# Patient Record
Sex: MALE | Race: WHITE | NOT HISPANIC OR LATINO | Employment: FULL TIME | URBAN - METROPOLITAN AREA
[De-identification: names, ages, dates, MRNs, and addresses within clinical notes are randomized per-mention and may not be internally consistent; named-entity substitution may affect disease eponyms.]

---

## 2023-05-15 ENCOUNTER — TELEPHONE (OUTPATIENT)
Dept: PRIMARY CARE | Facility: CLINIC | Age: 53
End: 2023-05-15

## 2023-05-15 DIAGNOSIS — E11.65 TYPE 2 DIABETES MELLITUS WITH HYPERGLYCEMIA, WITHOUT LONG-TERM CURRENT USE OF INSULIN (MULTI): Primary | ICD-10-CM

## 2023-05-15 RX ORDER — SEMAGLUTIDE 1.34 MG/ML
INJECTION, SOLUTION SUBCUTANEOUS
COMMUNITY
Start: 2022-03-08 | End: 2023-05-16 | Stop reason: SDUPTHER

## 2023-05-15 NOTE — TELEPHONE ENCOUNTER
Rx Refill Request Telephone Encounter    Name:  Juancarlos Valle  :  [id number]  Medication Name:  ozempic  4mg/3ml          Specific Pharmacy location:  [facility]

## 2023-05-16 DIAGNOSIS — E11.65 TYPE 2 DIABETES MELLITUS WITH HYPERGLYCEMIA, WITHOUT LONG-TERM CURRENT USE OF INSULIN (MULTI): Primary | ICD-10-CM

## 2023-05-16 RX ORDER — SEMAGLUTIDE 1.34 MG/ML
1 INJECTION, SOLUTION SUBCUTANEOUS
Qty: 3 ML | Refills: 0 | Status: SHIPPED | OUTPATIENT
Start: 2023-05-16 | End: 2024-05-07 | Stop reason: WASHOUT

## 2023-05-16 RX ORDER — SEMAGLUTIDE 1.34 MG/ML
1 INJECTION, SOLUTION SUBCUTANEOUS
Qty: 3 ML | Refills: 3 | Status: SHIPPED | OUTPATIENT
Start: 2023-05-16 | End: 2023-10-18 | Stop reason: SDUPTHER

## 2023-06-01 RX ORDER — ASPIRIN 81 MG/1
1 TABLET ORAL DAILY
COMMUNITY

## 2023-06-01 RX ORDER — CHOLECALCIFEROL (VITAMIN D3) 50 MCG
1 TABLET ORAL DAILY
COMMUNITY
Start: 2018-10-02

## 2023-06-01 RX ORDER — METFORMIN HYDROCHLORIDE 1000 MG/1
1 TABLET ORAL
COMMUNITY
Start: 2022-06-03 | End: 2023-10-18 | Stop reason: SDUPTHER

## 2023-06-01 RX ORDER — ICOSAPENT ETHYL 1 G/1
2 CAPSULE ORAL 2 TIMES DAILY
COMMUNITY
Start: 2022-02-25 | End: 2023-10-31

## 2023-06-01 RX ORDER — LISINOPRIL 10 MG/1
1 TABLET ORAL DAILY
COMMUNITY
End: 2023-10-27

## 2023-06-01 RX ORDER — NITROGLYCERIN 0.4 MG/1
0.4 TABLET SUBLINGUAL EVERY 5 MIN PRN
COMMUNITY
Start: 2020-07-10 | End: 2024-05-07 | Stop reason: SDUPTHER

## 2023-06-01 RX ORDER — ATORVASTATIN CALCIUM 80 MG/1
1 TABLET, FILM COATED ORAL DAILY
COMMUNITY
End: 2023-10-27

## 2023-06-01 RX ORDER — EMPAGLIFLOZIN 25 MG/1
1 TABLET, FILM COATED ORAL
COMMUNITY
Start: 2022-03-08 | End: 2023-10-31 | Stop reason: WASHOUT

## 2023-06-01 RX ORDER — OMEPRAZOLE 20 MG/1
1 TABLET, DELAYED RELEASE ORAL DAILY
COMMUNITY

## 2023-06-01 RX ORDER — METOPROLOL SUCCINATE 200 MG/1
1 TABLET, EXTENDED RELEASE ORAL DAILY
COMMUNITY
End: 2023-10-27

## 2023-06-01 RX ORDER — EZETIMIBE 10 MG/1
1 TABLET ORAL DAILY
COMMUNITY
Start: 2022-02-25 | End: 2024-05-07 | Stop reason: WASHOUT

## 2023-10-06 ENCOUNTER — TELEPHONE (OUTPATIENT)
Dept: PRIMARY CARE | Facility: CLINIC | Age: 53
End: 2023-10-06
Payer: COMMERCIAL

## 2023-10-18 ENCOUNTER — OFFICE VISIT (OUTPATIENT)
Dept: PRIMARY CARE | Facility: CLINIC | Age: 53
End: 2023-10-18
Payer: COMMERCIAL

## 2023-10-18 VITALS
WEIGHT: 281.4 LBS | BODY MASS INDEX: 45.22 KG/M2 | HEART RATE: 65 BPM | HEIGHT: 66 IN | RESPIRATION RATE: 16 BRPM | SYSTOLIC BLOOD PRESSURE: 102 MMHG | OXYGEN SATURATION: 95 % | DIASTOLIC BLOOD PRESSURE: 68 MMHG

## 2023-10-18 DIAGNOSIS — Z00.00 ANNUAL PHYSICAL EXAM: Primary | ICD-10-CM

## 2023-10-18 DIAGNOSIS — Z12.12 SCREENING FOR COLORECTAL CANCER: ICD-10-CM

## 2023-10-18 DIAGNOSIS — I10 HYPERTENSION, ESSENTIAL, BENIGN: ICD-10-CM

## 2023-10-18 DIAGNOSIS — E11.65 TYPE 2 DIABETES MELLITUS WITH HYPERGLYCEMIA, WITHOUT LONG-TERM CURRENT USE OF INSULIN (MULTI): ICD-10-CM

## 2023-10-18 DIAGNOSIS — E66.01 CLASS 3 SEVERE OBESITY DUE TO EXCESS CALORIES WITH SERIOUS COMORBIDITY AND BODY MASS INDEX (BMI) OF 45.0 TO 49.9 IN ADULT (MULTI): ICD-10-CM

## 2023-10-18 DIAGNOSIS — Z12.11 SCREENING FOR COLORECTAL CANCER: ICD-10-CM

## 2023-10-18 DIAGNOSIS — Z12.5 SCREENING FOR PROSTATE CANCER: ICD-10-CM

## 2023-10-18 DIAGNOSIS — E55.9 HYPOVITAMINOSIS D: ICD-10-CM

## 2023-10-18 DIAGNOSIS — E78.2 MIXED HYPERLIPIDEMIA: ICD-10-CM

## 2023-10-18 PROBLEM — E66.813 CLASS 3 SEVERE OBESITY DUE TO EXCESS CALORIES WITH SERIOUS COMORBIDITY AND BODY MASS INDEX (BMI) OF 45.0 TO 49.9 IN ADULT: Status: ACTIVE | Noted: 2023-10-18

## 2023-10-18 PROBLEM — G47.33 OBSTRUCTIVE SLEEP APNEA SYNDROME: Status: ACTIVE | Noted: 2023-10-18

## 2023-10-18 PROBLEM — E11.9 TYPE 2 DIABETES MELLITUS (MULTI): Status: ACTIVE | Noted: 2023-10-18

## 2023-10-18 PROBLEM — I25.2 HISTORY OF ST ELEVATION MYOCARDIAL INFARCTION (STEMI): Status: ACTIVE | Noted: 2023-10-18

## 2023-10-18 PROBLEM — I21.02 ST ELEVATION MYOCARDIAL INFARCTION INVOLVING LEFT ANTERIOR DESCENDING (LAD) CORONARY ARTERY (MULTI): Status: ACTIVE | Noted: 2023-10-18

## 2023-10-18 PROBLEM — Z95.5 H/O HEART ARTERY STENT: Status: ACTIVE | Noted: 2023-10-18

## 2023-10-18 PROBLEM — I25.10 CAD (CORONARY ARTERY DISEASE): Status: ACTIVE | Noted: 2023-10-18

## 2023-10-18 PROBLEM — E88.810 METABOLIC SYNDROME: Status: ACTIVE | Noted: 2023-10-18

## 2023-10-18 PROBLEM — E78.1 HIGH TRIGLYCERIDES: Status: ACTIVE | Noted: 2023-10-18

## 2023-10-18 PROCEDURE — 99396 PREV VISIT EST AGE 40-64: CPT | Performed by: NURSE PRACTITIONER

## 2023-10-18 PROCEDURE — 3046F HEMOGLOBIN A1C LEVEL >9.0%: CPT | Performed by: NURSE PRACTITIONER

## 2023-10-18 PROCEDURE — 99214 OFFICE O/P EST MOD 30 MIN: CPT | Performed by: NURSE PRACTITIONER

## 2023-10-18 PROCEDURE — 1036F TOBACCO NON-USER: CPT | Performed by: NURSE PRACTITIONER

## 2023-10-18 PROCEDURE — 4010F ACE/ARB THERAPY RXD/TAKEN: CPT | Performed by: NURSE PRACTITIONER

## 2023-10-18 PROCEDURE — 3074F SYST BP LT 130 MM HG: CPT | Performed by: NURSE PRACTITIONER

## 2023-10-18 PROCEDURE — 3008F BODY MASS INDEX DOCD: CPT | Performed by: NURSE PRACTITIONER

## 2023-10-18 PROCEDURE — 3078F DIAST BP <80 MM HG: CPT | Performed by: NURSE PRACTITIONER

## 2023-10-18 PROCEDURE — 99401 PREV MED CNSL INDIV APPRX 15: CPT | Performed by: NURSE PRACTITIONER

## 2023-10-18 RX ORDER — SODIUM FLUORIDE1.1%, POTASSIUM NITRATE 5% 5.8; 57.5 MG/ML; MG/ML
GEL, DENTIFRICE DENTAL
COMMUNITY
Start: 2023-04-07 | End: 2023-10-31 | Stop reason: ALTCHOICE

## 2023-10-18 RX ORDER — SEMAGLUTIDE 1.34 MG/ML
1 INJECTION, SOLUTION SUBCUTANEOUS
Qty: 3 ML | Refills: 0 | Status: SHIPPED | OUTPATIENT
Start: 2023-10-18 | End: 2024-05-07 | Stop reason: WASHOUT

## 2023-10-18 RX ORDER — METFORMIN HYDROCHLORIDE 1000 MG/1
1000 TABLET ORAL
Qty: 180 TABLET | Refills: 2 | Status: SHIPPED | OUTPATIENT
Start: 2023-10-18

## 2023-10-18 ASSESSMENT — COLUMBIA-SUICIDE SEVERITY RATING SCALE - C-SSRS
6. HAVE YOU EVER DONE ANYTHING, STARTED TO DO ANYTHING, OR PREPARED TO DO ANYTHING TO END YOUR LIFE?: NO
2. HAVE YOU ACTUALLY HAD ANY THOUGHTS OF KILLING YOURSELF?: NO
1. IN THE PAST MONTH, HAVE YOU WISHED YOU WERE DEAD OR WISHED YOU COULD GO TO SLEEP AND NOT WAKE UP?: NO

## 2023-10-18 ASSESSMENT — PATIENT HEALTH QUESTIONNAIRE - PHQ9
SUM OF ALL RESPONSES TO PHQ9 QUESTIONS 1 AND 2: 0
1. LITTLE INTEREST OR PLEASURE IN DOING THINGS: NOT AT ALL
2. FEELING DOWN, DEPRESSED OR HOPELESS: NOT AT ALL

## 2023-10-18 ASSESSMENT — ANXIETY QUESTIONNAIRES
4. TROUBLE RELAXING: NOT AT ALL
2. NOT BEING ABLE TO STOP OR CONTROL WORRYING: NOT AT ALL
6. BECOMING EASILY ANNOYED OR IRRITABLE: NOT AT ALL
7. FEELING AFRAID AS IF SOMETHING AWFUL MIGHT HAPPEN: NOT AT ALL
3. WORRYING TOO MUCH ABOUT DIFFERENT THINGS: NOT AT ALL
1. FEELING NERVOUS, ANXIOUS, OR ON EDGE: NOT AT ALL
IF YOU CHECKED OFF ANY PROBLEMS ON THIS QUESTIONNAIRE, HOW DIFFICULT HAVE THESE PROBLEMS MADE IT FOR YOU TO DO YOUR WORK, TAKE CARE OF THINGS AT HOME, OR GET ALONG WITH OTHER PEOPLE: NOT DIFFICULT AT ALL
5. BEING SO RESTLESS THAT IT IS HARD TO SIT STILL: NOT AT ALL
GAD7 TOTAL SCORE: 0

## 2023-10-18 ASSESSMENT — ENCOUNTER SYMPTOMS
OCCASIONAL FEELINGS OF UNSTEADINESS: 0
DEPRESSION: 0
LOSS OF SENSATION IN FEET: 0

## 2023-10-18 NOTE — PROGRESS NOTES
"Subjective   Patient ID: Juancarlos Valle is a 53 y.o. male who presents for Follow-up.    Patient is following up for annual physical exam and management of multiple chronic diseases.  He is currently on Ozempic 1 mg injection weekly.  His most recent hemoglobin A1c was 9.2% which is very elevated indicating uncontrolled diabetes.  Reports that he ran out a few weeks ago and will need refills.  He has never completed screening for colorectal cancer.  Advises that he has follow-up with his cardiology next month.  Advises that he has no acute medical complaint.         Review of Systems   All other systems reviewed and are negative.      Objective   /68   Pulse 65   Resp 16   Ht 1.676 m (5' 6\")   Wt 128 kg (281 lb 6.4 oz)   SpO2 95%   BMI 45.42 kg/m²     Physical Exam  Constitutional:       Appearance: Normal appearance. He is obese.   HENT:      Head: Normocephalic and atraumatic.      Right Ear: External ear normal.      Left Ear: External ear normal.      Nose: Nose normal.      Mouth/Throat:      Mouth: Mucous membranes are moist.   Cardiovascular:      Rate and Rhythm: Normal rate and regular rhythm.      Pulses: Normal pulses.      Heart sounds: Normal heart sounds.   Pulmonary:      Effort: Pulmonary effort is normal.      Breath sounds: Normal breath sounds.   Abdominal:      General: Bowel sounds are normal.      Palpations: Abdomen is soft.   Musculoskeletal:      Cervical back: Neck supple.   Skin:     General: Skin is warm and dry.   Neurological:      General: No focal deficit present.      Mental Status: He is alert and oriented to person, place, and time.   Psychiatric:         Mood and Affect: Mood normal.         Behavior: Behavior normal.         Thought Content: Thought content normal.         Judgment: Judgment normal.         Assessment/Plan   Problem List Items Addressed This Visit       Mixed hyperlipidemia    Relevant Orders    Lipid panel    TSH with reflex to Free T4 if abnormal    " Hypertension, essential, benign    Relevant Orders    CBC    Hypovitaminosis D - Primary    Relevant Orders    Vitamin D 25-Hydroxy,Total (for eval of Vitamin D levels)    Vitamin B12    Type 2 diabetes mellitus (CMS/HCC)    Relevant Medications    semaglutide (Ozempic) 1 mg/dose (4 mg/3 mL) pen injector    metFORMIN (Glucophage) 1,000 mg tablet    semaglutide 2 mg/dose (8 mg/3 mL) pen injector    Other Relevant Orders    Hemoglobin A1C    Comprehensive Metabolic Panel    Urinalysis with Reflex Microscopic    Albumin, urine, random    Follow Up In [facility] - PCP - Established     Other Visit Diagnoses       Screening for colorectal cancer        Relevant Orders    Colonoscopy Screening    Screening for prostate cancer        Relevant Orders    PSA

## 2023-10-22 DIAGNOSIS — I21.02 ST ELEVATION (STEMI) MYOCARDIAL INFARCTION INVOLVING LEFT ANTERIOR DESCENDING CORONARY ARTERY (MULTI): ICD-10-CM

## 2023-10-22 DIAGNOSIS — I25.10 ATHEROSCLEROTIC HEART DISEASE OF NATIVE CORONARY ARTERY WITHOUT ANGINA PECTORIS: ICD-10-CM

## 2023-10-23 DIAGNOSIS — I10 ESSENTIAL (PRIMARY) HYPERTENSION: ICD-10-CM

## 2023-10-26 NOTE — PROGRESS NOTES
Chief Complaint/Reason for Visit:   Patient is coming in today as a 6 month Cardiovascular follow up.        History Of Present Illness:    Mr. Valle is coming today as a 6-month cardiovascular follow-up.  We have followed this patient previously for ASHD, chronic diastolic heart failure, and hyperlipidemia.  In 2018, patient had an anterior STEMI with PCI/KAREN to the proximal to mid LAD and a POBA to the distal LAD.    Patient comes in today generally feeling well.  He is a bit discouraged about his weight.  He denies any chest pain, pressure, palpitations, orthopnea, or edema.  He has been taking his medication as prescribed.    Past Medical History:  He has a past medical history of Personal history of other diseases of the digestive system and Personal history of other endocrine, nutritional and metabolic disease.    Past Surgical History:  He has a past surgical history that includes Tympanostomy tube placement (06/05/2018); Ankle surgery (06/05/2018); Hernia repair (06/05/2018); and Other surgical history (05/25/2018).      Social History:  He reports that he has quit smoking. His smoking use included cigarettes. He has never used smokeless tobacco. He reports that he does not currently use alcohol. He reports that he does not use drugs.    Family History:  Family History   Problem Relation Name Age of Onset    Scleroderma Mother      Crohn's disease Brother          Allergies:  Levofloxacin    Medications:  Current Outpatient Medications   Medication Instructions    aspirin 81 mg EC tablet 1 tablet, oral, Daily    atorvastatin (LIPITOR) 80 mg, oral, Daily    cholecalciferol (Vitamin D-3) 50 MCG (2000 UT) tablet 1 tablet, oral, Daily    ezetimibe (Zetia) 10 mg tablet 1 tablet, oral, Daily    icosapent ethyL (Vascepa) 1 gram capsule 2 capsules, oral, 2 times daily    Jardiance 25 mg 1 tablet, oral, Daily before breakfast    lisinopril 10 mg, oral, Daily    metFORMIN (GLUCOPHAGE) 1,000 mg, oral, 2 times daily with  meals    metoprolol succinate XL (TOPROL-XL) 200 mg, oral, Daily    nitroglycerin (NITROSTAT) 0.4 mg, sublingual, Every 5 min PRN    omeprazole OTC (PriLOSEC OTC) 20 mg EC tablet 1 tablet, oral, Daily    Ozempic 1 mg, subcutaneous, Weekly    Ozempic 1 mg, subcutaneous, Weekly    semaglutide 2 mg, subcutaneous, Weekly    sodium fluoride-pot nitrate 1.1-5 % paste USE TO BRUSH TEETH TWICE DAILY, RINSE AND SPIT OUT       Review of Systems:  Review of Systems   Constitutional: Negative. Negative for decreased appetite.   HENT: Negative.     Eyes:  Negative for blurred vision and visual disturbance.   Cardiovascular:  Negative for chest pain, dyspnea on exertion, irregular heartbeat, leg swelling, orthopnea, palpitations and syncope.   Respiratory: Negative.  Negative for cough and shortness of breath.    Musculoskeletal:  Negative for arthritis and falls.   Gastrointestinal: Negative.    Neurological:  Negative for focal weakness and light-headedness.   Psychiatric/Behavioral:  Negative for depression and memory loss.         Vitals  Visit Vitals  /74   Pulse 92   Wt 127 kg (279 lb)   BMI 45.03 kg/m²   Smoking Status Former   BSA 2.43 m²          Physical Exam:   GEN: well-nourished, no acute distress  SKIN: no rashes, well perfused  HEENT: normocephalic and atraumatic, no neck swelling  ORAL: Mask intact, lips and oropharynx normal appearance  CV: Normal S1 S2 no murmurs, no carotid bruits, trace bilateral leg swelling, no venous prominence in the neck  RESP:Lung sounds are clear, thorax symmetrical, normal respiratory effort and rate, no accessory muscle use  MSK: normal appearing muscle tone, normal range of motion, no joint swelling  NEURO: no gross focal neuro deficit, oriented to time, place, and person  ABD: nondistended  PSYCH: normal mood and affect     Last Labs:  CBC -  Lab Results   Component Value Date    WBC 13.8 (H) 01/23/2023    HGB 17.0 01/23/2023    HCT 48.9 01/23/2023    MCV 89 01/23/2023    PLT  280 01/23/2023     Lab Results   Component Value Date    GLUCOSE 221 (H) 01/23/2023    CALCIUM 9.1 01/23/2023     (L) 01/23/2023    K 4.5 01/23/2023    CO2 28 01/23/2023    CL 98 01/23/2023    BUN 12 01/23/2023    CREATININE 0.81 01/23/2023      CMP -  Lab Results   Component Value Date    CALCIUM 9.1 01/23/2023    PROT 7.0 01/23/2023    ALBUMIN 4.2 01/23/2023    AST 22 01/23/2023    ALT 44 01/23/2023    ALKPHOS 114 01/23/2023    BILITOT 1.8 (H) 01/23/2023       LIPID PANEL -   Lab Results   Component Value Date    CHOL 152 02/21/2022    TRIG 276 (H) 02/21/2022    HDL 31.4 (A) 02/21/2022    CHHDL 4.8 02/21/2022    LDLF 65 02/21/2022    VLDL 55 (H) 02/21/2022    NHDL 121 02/21/2022       Lab Results   Component Value Date    HGBA1C 9.2 (A) 01/23/2023         Lab review: I have personally reviewed the laboratory result(s)     Assessment/Plan:  ASHD: Patient had a history of a STEMI with PCI to the proximal to mid LAD and POBA to the distal LAD in 2018.  His current cardiac regimen consists of an aspirin, atorvastatin, Zetia, lisinopril, and metoprolol.  He had been prescribed Jardiance and Vascepa but could not afford either.  He does take over-the-counter fish oil.  Patient is angina class 0.  We discussed the importance of working on weight loss and eating a heart healthy diet.    Hypertension: Blood pressure today is well controlled at 116/74.  He will continue on his current blood pressure lowering medications.    Hyperlipidemia: Patient currently is on atorvastatin 80 mg nightly, fish oil, and Zetia 10 mg nightly.  He is scheduled for fasting lab work this week including a lipid profile.  Ideally, we would like to see him with an LDL of less than 70.  Patient will continue on his current regimen for now.    Patient will follow-up with Dr. Islas in 6 months.  Patient instructed to call with any cardiovascular complaints. All questions were answered.       Dragon dictation was utilized to create this  document. Quite often unanticipated grammatical, syntax,  and other interpretive errors are inadvertently transcribed by the computer software.  Please disregard these errors.  Please excuse any errors that have escaped final proofreading.          Sahara Ballesteros, NIKUNJ-CNP

## 2023-10-27 RX ORDER — ATORVASTATIN CALCIUM 80 MG/1
80 TABLET, FILM COATED ORAL DAILY
Qty: 90 TABLET | Refills: 1 | Status: SHIPPED | OUTPATIENT
Start: 2023-10-27 | End: 2024-05-07 | Stop reason: SDUPTHER

## 2023-10-27 RX ORDER — LISINOPRIL 10 MG/1
10 TABLET ORAL DAILY
Qty: 90 TABLET | Refills: 1 | Status: SHIPPED | OUTPATIENT
Start: 2023-10-27 | End: 2024-05-07 | Stop reason: SDUPTHER

## 2023-10-27 RX ORDER — METOPROLOL SUCCINATE 200 MG/1
200 TABLET, EXTENDED RELEASE ORAL DAILY
Qty: 90 TABLET | Refills: 1 | Status: SHIPPED | OUTPATIENT
Start: 2023-10-27 | End: 2024-05-07 | Stop reason: SDUPTHER

## 2023-10-31 ENCOUNTER — OFFICE VISIT (OUTPATIENT)
Dept: CARDIOLOGY | Facility: CLINIC | Age: 53
End: 2023-10-31
Payer: COMMERCIAL

## 2023-10-31 VITALS
WEIGHT: 279 LBS | DIASTOLIC BLOOD PRESSURE: 74 MMHG | BODY MASS INDEX: 45.03 KG/M2 | HEART RATE: 92 BPM | SYSTOLIC BLOOD PRESSURE: 116 MMHG

## 2023-10-31 DIAGNOSIS — I10 HYPERTENSION, ESSENTIAL, BENIGN: ICD-10-CM

## 2023-10-31 DIAGNOSIS — E78.2 MIXED HYPERLIPIDEMIA: ICD-10-CM

## 2023-10-31 DIAGNOSIS — I25.10 CORONARY ARTERY DISEASE INVOLVING NATIVE CORONARY ARTERY OF NATIVE HEART WITHOUT ANGINA PECTORIS: Primary | ICD-10-CM

## 2023-10-31 PROCEDURE — 3078F DIAST BP <80 MM HG: CPT | Performed by: NURSE PRACTITIONER

## 2023-10-31 PROCEDURE — 3008F BODY MASS INDEX DOCD: CPT | Performed by: NURSE PRACTITIONER

## 2023-10-31 PROCEDURE — 3074F SYST BP LT 130 MM HG: CPT | Performed by: NURSE PRACTITIONER

## 2023-10-31 PROCEDURE — 1036F TOBACCO NON-USER: CPT | Performed by: NURSE PRACTITIONER

## 2023-10-31 PROCEDURE — 3046F HEMOGLOBIN A1C LEVEL >9.0%: CPT | Performed by: NURSE PRACTITIONER

## 2023-10-31 PROCEDURE — 4010F ACE/ARB THERAPY RXD/TAKEN: CPT | Performed by: NURSE PRACTITIONER

## 2023-10-31 PROCEDURE — 99214 OFFICE O/P EST MOD 30 MIN: CPT | Performed by: NURSE PRACTITIONER

## 2023-10-31 ASSESSMENT — ENCOUNTER SYMPTOMS
DYSPNEA ON EXERTION: 0
BLURRED VISION: 0
SYNCOPE: 0
SHORTNESS OF BREATH: 0
CONSTITUTIONAL NEGATIVE: 1
DEPRESSION: 0
PALPITATIONS: 0
GASTROINTESTINAL NEGATIVE: 1
FOCAL WEAKNESS: 0
FALLS: 0
LIGHT-HEADEDNESS: 0
DECREASED APPETITE: 0
RESPIRATORY NEGATIVE: 1
MEMORY LOSS: 0
COUGH: 0
IRREGULAR HEARTBEAT: 0
ORTHOPNEA: 0

## 2023-10-31 NOTE — PATIENT INSTRUCTIONS
Continue on current meds  Heart healthy, low sodium diet  Mediterranean diet is recommended  Follow up with Dr Islas .in 6 months

## 2023-11-01 ENCOUNTER — LAB (OUTPATIENT)
Dept: LAB | Facility: LAB | Age: 53
End: 2023-11-01
Payer: COMMERCIAL

## 2023-11-01 DIAGNOSIS — E55.9 HYPOVITAMINOSIS D: Primary | ICD-10-CM

## 2023-11-01 DIAGNOSIS — I10 HYPERTENSION, ESSENTIAL, BENIGN: ICD-10-CM

## 2023-11-01 DIAGNOSIS — E11.65 TYPE 2 DIABETES MELLITUS WITH HYPERGLYCEMIA, WITHOUT LONG-TERM CURRENT USE OF INSULIN (MULTI): ICD-10-CM

## 2023-11-01 DIAGNOSIS — E78.2 MIXED HYPERLIPIDEMIA: ICD-10-CM

## 2023-11-01 DIAGNOSIS — R79.89 LOW VITAMIN B12 LEVEL: ICD-10-CM

## 2023-11-01 DIAGNOSIS — E55.9 HYPOVITAMINOSIS D: ICD-10-CM

## 2023-11-01 DIAGNOSIS — Z12.5 SCREENING FOR PROSTATE CANCER: ICD-10-CM

## 2023-11-01 LAB
25(OH)D3 SERPL-MCNC: 19 NG/ML (ref 30–100)
ALBUMIN SERPL BCP-MCNC: 4.1 G/DL (ref 3.4–5)
ALP SERPL-CCNC: 124 U/L (ref 33–120)
ALT SERPL W P-5'-P-CCNC: 33 U/L (ref 10–52)
ANION GAP SERPL CALC-SCNC: 14 MMOL/L (ref 10–20)
APPEARANCE UR: ABNORMAL
AST SERPL W P-5'-P-CCNC: 18 U/L (ref 9–39)
BILIRUB SERPL-MCNC: 1.4 MG/DL (ref 0–1.2)
BILIRUB UR STRIP.AUTO-MCNC: NEGATIVE MG/DL
BUN SERPL-MCNC: 12 MG/DL (ref 6–23)
CALCIUM SERPL-MCNC: 9.1 MG/DL (ref 8.6–10.3)
CAOX CRY #/AREA UR COMP ASSIST: NORMAL /HPF
CHLORIDE SERPL-SCNC: 100 MMOL/L (ref 98–107)
CHOLEST SERPL-MCNC: 108 MG/DL (ref 0–199)
CHOLESTEROL/HDL RATIO: 2.9
CO2 SERPL-SCNC: 25 MMOL/L (ref 21–32)
COLOR UR: YELLOW
CREAT SERPL-MCNC: 0.72 MG/DL (ref 0.5–1.3)
CREAT UR-MCNC: 227.1 MG/DL (ref 20–370)
ERYTHROCYTE [DISTWIDTH] IN BLOOD BY AUTOMATED COUNT: 12 % (ref 11.5–14.5)
EST. AVERAGE GLUCOSE BLD GHB EST-MCNC: 266 MG/DL
GFR SERPL CREATININE-BSD FRML MDRD: >90 ML/MIN/1.73M*2
GLUCOSE SERPL-MCNC: 254 MG/DL (ref 74–99)
GLUCOSE UR STRIP.AUTO-MCNC: ABNORMAL MG/DL
HBA1C MFR BLD: 10.9 %
HCT VFR BLD AUTO: 46.8 % (ref 41–52)
HDLC SERPL-MCNC: 36.8 MG/DL
HGB BLD-MCNC: 15.9 G/DL (ref 13.5–17.5)
KETONES UR STRIP.AUTO-MCNC: ABNORMAL MG/DL
LDLC SERPL CALC-MCNC: 25 MG/DL
LEUKOCYTE ESTERASE UR QL STRIP.AUTO: NEGATIVE
MCH RBC QN AUTO: 31.4 PG (ref 26–34)
MCHC RBC AUTO-ENTMCNC: 34 G/DL (ref 32–36)
MCV RBC AUTO: 92 FL (ref 80–100)
MICROALBUMIN UR-MCNC: 12.9 MG/L
MICROALBUMIN/CREAT UR: 5.7 UG/MG CREAT
MUCOUS THREADS #/AREA URNS AUTO: NORMAL /LPF
NITRITE UR QL STRIP.AUTO: NEGATIVE
NON HDL CHOLESTEROL: 71 MG/DL (ref 0–149)
NRBC BLD-RTO: 0 /100 WBCS (ref 0–0)
PH UR STRIP.AUTO: 5 [PH]
PLATELET # BLD AUTO: 287 X10*3/UL (ref 150–450)
PMV BLD AUTO: 10 FL (ref 7.5–11.5)
POTASSIUM SERPL-SCNC: 4.2 MMOL/L (ref 3.5–5.3)
PROT SERPL-MCNC: 5.9 G/DL (ref 6.4–8.2)
PROT UR STRIP.AUTO-MCNC: ABNORMAL MG/DL
PSA SERPL-MCNC: 0.19 NG/ML
RBC # BLD AUTO: 5.07 X10*6/UL (ref 4.5–5.9)
RBC # UR STRIP.AUTO: NEGATIVE /UL
RBC #/AREA URNS AUTO: NORMAL /HPF
SODIUM SERPL-SCNC: 135 MMOL/L (ref 136–145)
SP GR UR STRIP.AUTO: 1.03
TRIGL SERPL-MCNC: 233 MG/DL (ref 0–149)
TSH SERPL-ACNC: 1.7 MIU/L (ref 0.44–3.98)
UROBILINOGEN UR STRIP.AUTO-MCNC: <2 MG/DL
VIT B12 SERPL-MCNC: 281 PG/ML (ref 211–911)
VLDL: 47 MG/DL (ref 0–40)
WBC # BLD AUTO: 10.5 X10*3/UL (ref 4.4–11.3)
WBC #/AREA URNS AUTO: NORMAL /HPF

## 2023-11-01 PROCEDURE — 84153 ASSAY OF PSA TOTAL: CPT

## 2023-11-01 PROCEDURE — 83036 HEMOGLOBIN GLYCOSYLATED A1C: CPT

## 2023-11-01 PROCEDURE — 84443 ASSAY THYROID STIM HORMONE: CPT

## 2023-11-01 PROCEDURE — 80053 COMPREHEN METABOLIC PANEL: CPT

## 2023-11-01 PROCEDURE — 81001 URINALYSIS AUTO W/SCOPE: CPT

## 2023-11-01 PROCEDURE — 36415 COLL VENOUS BLD VENIPUNCTURE: CPT

## 2023-11-01 PROCEDURE — 82306 VITAMIN D 25 HYDROXY: CPT

## 2023-11-01 PROCEDURE — 82607 VITAMIN B-12: CPT

## 2023-11-01 PROCEDURE — 82043 UR ALBUMIN QUANTITATIVE: CPT

## 2023-11-01 PROCEDURE — 85027 COMPLETE CBC AUTOMATED: CPT

## 2023-11-01 PROCEDURE — 82570 ASSAY OF URINE CREATININE: CPT

## 2023-11-01 PROCEDURE — 80061 LIPID PANEL: CPT

## 2023-11-01 RX ORDER — PNV NO.95/FERROUS FUM/FOLIC AC 28MG-0.8MG
100 TABLET ORAL DAILY
Qty: 90 TABLET | Refills: 3 | Status: SHIPPED | OUTPATIENT
Start: 2023-11-01 | End: 2024-05-07 | Stop reason: WASHOUT

## 2023-11-01 RX ORDER — ERGOCALCIFEROL 1.25 MG/1
50000 CAPSULE ORAL
Qty: 12 CAPSULE | Refills: 2 | Status: SHIPPED | OUTPATIENT
Start: 2023-11-01 | End: 2024-05-07 | Stop reason: WASHOUT

## 2023-11-02 ENCOUNTER — TELEPHONE (OUTPATIENT)
Dept: PRIMARY CARE | Facility: CLINIC | Age: 53
End: 2023-11-02
Payer: COMMERCIAL

## 2023-11-02 NOTE — TELEPHONE ENCOUNTER
----- Message from NIKUNJ Lemus-CNP sent at 11/1/2023  8:30 PM EDT -----  Please, tell patient that his lab results show low vitamin D at 17, low vitamin B12 at 281, elevated Triglycerides at 233 and very elevated A1C at 10.9%. I have started him on vitamin D 50,000 units weekly, vitamin B12 100mcg daily and he should continue with fish oil. I strongly recommend that he follows up with his endocrinologist, Dr. Ramon in [place] for his elevated A1C at 10.9%.

## 2024-05-07 ENCOUNTER — OFFICE VISIT (OUTPATIENT)
Dept: CARDIOLOGY | Facility: CLINIC | Age: 54
End: 2024-05-07
Payer: COMMERCIAL

## 2024-05-07 VITALS
HEIGHT: 66 IN | WEIGHT: 266 LBS | SYSTOLIC BLOOD PRESSURE: 122 MMHG | BODY MASS INDEX: 42.75 KG/M2 | DIASTOLIC BLOOD PRESSURE: 78 MMHG | HEART RATE: 96 BPM

## 2024-05-07 DIAGNOSIS — E78.1 HYPERTRIGLYCERIDEMIA: ICD-10-CM

## 2024-05-07 DIAGNOSIS — I25.10 ATHEROSCLEROTIC HEART DISEASE OF NATIVE CORONARY ARTERY WITHOUT ANGINA PECTORIS: ICD-10-CM

## 2024-05-07 DIAGNOSIS — I25.10 CORONARY ARTERY DISEASE INVOLVING NATIVE CORONARY ARTERY OF NATIVE HEART WITHOUT ANGINA PECTORIS: Primary | ICD-10-CM

## 2024-05-07 DIAGNOSIS — I21.02 ST ELEVATION (STEMI) MYOCARDIAL INFARCTION INVOLVING LEFT ANTERIOR DESCENDING CORONARY ARTERY (MULTI): ICD-10-CM

## 2024-05-07 DIAGNOSIS — I10 ESSENTIAL (PRIMARY) HYPERTENSION: ICD-10-CM

## 2024-05-07 DIAGNOSIS — E11.69 TYPE 2 DIABETES MELLITUS WITH OTHER SPECIFIED COMPLICATION, UNSPECIFIED WHETHER LONG TERM INSULIN USE (MULTI): ICD-10-CM

## 2024-05-07 PROCEDURE — 1036F TOBACCO NON-USER: CPT | Performed by: INTERNAL MEDICINE

## 2024-05-07 PROCEDURE — 93000 ELECTROCARDIOGRAM COMPLETE: CPT | Performed by: INTERNAL MEDICINE

## 2024-05-07 PROCEDURE — 3008F BODY MASS INDEX DOCD: CPT | Performed by: INTERNAL MEDICINE

## 2024-05-07 PROCEDURE — 99214 OFFICE O/P EST MOD 30 MIN: CPT | Performed by: INTERNAL MEDICINE

## 2024-05-07 PROCEDURE — 4010F ACE/ARB THERAPY RXD/TAKEN: CPT | Performed by: INTERNAL MEDICINE

## 2024-05-07 PROCEDURE — 3078F DIAST BP <80 MM HG: CPT | Performed by: INTERNAL MEDICINE

## 2024-05-07 PROCEDURE — 3074F SYST BP LT 130 MM HG: CPT | Performed by: INTERNAL MEDICINE

## 2024-05-07 RX ORDER — ATORVASTATIN CALCIUM 80 MG/1
80 TABLET, FILM COATED ORAL DAILY
Qty: 90 TABLET | Refills: 3 | Status: SHIPPED | OUTPATIENT
Start: 2024-05-07 | End: 2025-05-07

## 2024-05-07 RX ORDER — NITROGLYCERIN 0.4 MG/1
0.4 TABLET SUBLINGUAL EVERY 5 MIN PRN
Qty: 45 TABLET | Refills: 11 | Status: SHIPPED | OUTPATIENT
Start: 2024-05-07 | End: 2025-05-07

## 2024-05-07 RX ORDER — METOPROLOL SUCCINATE 200 MG/1
200 TABLET, EXTENDED RELEASE ORAL DAILY
Qty: 90 TABLET | Refills: 3 | Status: SHIPPED | OUTPATIENT
Start: 2024-05-07 | End: 2025-05-07

## 2024-05-07 RX ORDER — LISINOPRIL 10 MG/1
10 TABLET ORAL DAILY
Qty: 90 TABLET | Refills: 3 | Status: SHIPPED | OUTPATIENT
Start: 2024-05-07 | End: 2025-05-07

## 2024-05-07 NOTE — PATIENT INSTRUCTIONS
Thanks for following up in office today.    1)  We reviewed your cholesterol blood work.  Your triglyceride level is high , this should come down once your blood sugar is under better control.  We do have a list of foods that are high in triglycerides.    2)  I have sent in refills for your medications.     3)  Please continue your cardiac medications as prescribed.    Follow up with LILI VIRAMONTES  in 6  months  If you have any questions, please call [phone] and choose option for Dr. Islas's nurse Batsheva Levin

## 2024-05-07 NOTE — PROGRESS NOTES
Chief Complaint:   No chief complaint on file.     History Of Present Illness:    Juancarlos Valle is a 53 y.o. male presenting for yearly follow up    h/o CAD s/p anterior STEMI 5/19/18 s/p PCI with KAREN to prox-mid LAD and POBA distal LAD, Preserved LVEF 55%, moderate upper septal hypertrophy, mild eccentric AR, DL/hypertriglyceridemia, DM2 who presents for annual follow up.     No chest pain/pressure.  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  With this no chest pain/sob.  No LE edema, orthopnea.  No palps, LH/dizziness, near syncope, syncope.  Does not smoke tobacco.      ROS:  The remainder of the review of systems was obtained, as was negative as pertains to the chief complaint.    CV testing :  Lipid labs 11/2023  chol 108, HDL 36.8  LDL 25  trig 233, CMP  K 4.2  BUN 12 crea 0.72  ast 18  alt 33    Stress test 6/2018:  Normal exercise stress test, below average exercise capacity.    TTE 5/2018  EF 55% stage I DD, moderate upper septal hypertrophy, mild aortic regurg , trace tricuspid regurg,     [facility] 5/2018  PCI  mid LAD    Prior history:   The patient presented in 5/2018 with c/o chest pain waking him from sleep,with asociated SOB, dizziness, clammy sensation, diaphoresis. [facility] cor angio demonstrated acute prox-mid LAD plaque rupture, with 100% thrombotic occlusion of the distal LAD. S/p successful PCI with aspiration thrombectomy, Xience Alpine 3.5 x 23mm KAREN to prox-mid LAD, postdilated with NC 3.5mm balloon at 20 ayla, then 3.75mm balloon at 22 ayla; as well as POBA to distal LAD with improvement in blood flow. His TTE on 5/22/18 demonstrated EF 55%, moderate upper septal hypertrophy with normal LV thickness, mild eccentric AR, trace TR.     Last Recorded Vitals:  There were no vitals filed for this visit.    Past Medical History:  He has a past medical history of Diabetes mellitus (Multi), Hyperlipidemia, Hypertension, MI (myocardial infarction) (Multi), Personal history of other diseases of  the digestive system, and Personal history of other endocrine, nutritional and metabolic disease.    Past Surgical History:  He has a past surgical history that includes Tympanostomy tube placement (06/05/2018); Ankle surgery (06/05/2018); Hernia repair (06/05/2018); Other surgical history (05/25/2018); and Cardiac catheterization.      Social History:  He reports that he has quit smoking. His smoking use included cigarettes. He has never used smokeless tobacco. He reports that he does not currently use alcohol. He reports that he does not use drugs.    Family History:  Family History   Problem Relation Name Age of Onset    Scleroderma Mother      Crohn's disease Brother          Allergies:  Levofloxacin    Outpatient Medications:  Current Outpatient Medications   Medication Instructions    aspirin 81 mg EC tablet 1 tablet, oral, Daily    atorvastatin (LIPITOR) 80 mg, oral, Daily    cholecalciferol (Vitamin D-3) 50 MCG (2000 UT) tablet 1 tablet, oral, Daily    cyanocobalamin (VITAMIN B-12) 100 mcg, oral, Daily    ergocalciferol (VITAMIN D-2) 50,000 Units, oral, Once Weekly    ezetimibe (Zetia) 10 mg tablet 1 tablet, oral, Daily    lisinopril 10 mg, oral, Daily    metFORMIN (GLUCOPHAGE) 1,000 mg, oral, 2 times daily with meals    metoprolol succinate XL (TOPROL-XL) 200 mg, oral, Daily    nitroglycerin (NITROSTAT) 0.4 mg, sublingual, Every 5 min PRN    omeprazole OTC (PriLOSEC OTC) 20 mg EC tablet 1 tablet, oral, Daily    Ozempic 1 mg, subcutaneous, Once Weekly    Ozempic 1 mg, subcutaneous, Once Weekly    semaglutide 2 mg, subcutaneous, Once Weekly       Physical Exam:  Physical Exam  HENT:      Head: Normocephalic.      Nose: Nose normal.   Cardiovascular:      Rate and Rhythm: Normal rate and regular rhythm.      Heart sounds: Normal heart sounds.      Comments: No carotid bruits   No leg swelling  Pulmonary:      Breath sounds: Normal breath sounds.   Abdominal:      Palpations: Abdomen is soft.   Musculoskeletal:          General: Normal range of motion.      Cervical back: Normal range of motion.   Skin:     General: Skin is warm and dry.   Neurological:      General: No focal deficit present.      Mental Status: He is alert.   Psychiatric:         Mood and Affect: Mood normal.            Last Labs:  CBC -  Lab Results   Component Value Date    WBC 10.5 11/01/2023    HGB 15.9 11/01/2023    HCT 46.8 11/01/2023    MCV 92 11/01/2023     11/01/2023       CMP -  Lab Results   Component Value Date    CALCIUM 9.1 11/01/2023    PROT 5.9 (L) 11/01/2023    ALBUMIN 4.1 11/01/2023    AST 18 11/01/2023    ALT 33 11/01/2023    ALKPHOS 124 (H) 11/01/2023    BILITOT 1.4 (H) 11/01/2023       LIPID PANEL -   Lab Results   Component Value Date    CHOL 108 11/01/2023    TRIG 233 (H) 11/01/2023    HDL 36.8 11/01/2023    CHHDL 2.9 11/01/2023    LDLF 65 02/21/2022    VLDL 47 (H) 11/01/2023    NHDL 71 11/01/2023       RENAL FUNCTION PANEL -   Lab Results   Component Value Date    GLUCOSE 254 (H) 11/01/2023     (L) 11/01/2023    K 4.2 11/01/2023     11/01/2023    CO2 25 11/01/2023    ANIONGAP 14 11/01/2023    BUN 12 11/01/2023    CREATININE 0.72 11/01/2023    GFRMALE >90 01/23/2023    CALCIUM 9.1 11/01/2023    ALBUMIN 4.1 11/01/2023        Lab Results   Component Value Date    HGBA1C 10.9 (H) 11/01/2023               Assessment/Plan   ASHD  DL - hypertriglyceridemia  DM2    Doing well symptomatically, no angina, no HF signs/symptoms.    Of note, lipids at goal on high dose atorva (stopped zetia on his own), however with high HbA1c > 10, his TG are > 250.  We discussed the importance of DM management and diet control for lowering Tg's.    Plan:  -ASA 81mg daily lifelong  -continue toprol XL 200mg daily   -cont lisinopril 10mg daily  -c/w high intensity statin therapy - atorva 80mg daily  -vascepa too expensive - ok to continue fish oil 2gm bid  -advocated improvement in DM2 control, reg mod intensity exercise, and weight  loss  -aggressive secondary prevention, we discussed today 150 mins of moderate intensity exercise  -SL Ntg prn

## 2024-07-05 DIAGNOSIS — E11.65 TYPE 2 DIABETES MELLITUS WITH HYPERGLYCEMIA, WITHOUT LONG-TERM CURRENT USE OF INSULIN (MULTI): ICD-10-CM

## 2024-07-09 RX ORDER — SEMAGLUTIDE 2.68 MG/ML
2 INJECTION, SOLUTION SUBCUTANEOUS
Qty: 3 ML | Refills: 0 | Status: SHIPPED | OUTPATIENT
Start: 2024-07-14

## 2024-10-30 ENCOUNTER — TELEPHONE (OUTPATIENT)
Dept: PRIMARY CARE | Facility: CLINIC | Age: 54
End: 2024-10-30
Payer: COMMERCIAL

## 2024-11-07 ENCOUNTER — TELEPHONE (OUTPATIENT)
Dept: CARDIOLOGY | Facility: HOSPITAL | Age: 54
End: 2024-11-07

## 2024-11-07 ENCOUNTER — APPOINTMENT (OUTPATIENT)
Dept: CARDIOLOGY | Facility: CLINIC | Age: 54
End: 2024-11-07
Payer: COMMERCIAL

## 2024-11-07 VITALS
HEART RATE: 102 BPM | WEIGHT: 262 LBS | SYSTOLIC BLOOD PRESSURE: 130 MMHG | OXYGEN SATURATION: 95 % | DIASTOLIC BLOOD PRESSURE: 84 MMHG | BODY MASS INDEX: 42.11 KG/M2 | HEIGHT: 66 IN

## 2024-11-07 DIAGNOSIS — E78.2 MIXED HYPERLIPIDEMIA: ICD-10-CM

## 2024-11-07 DIAGNOSIS — Z95.5 H/O HEART ARTERY STENT: ICD-10-CM

## 2024-11-07 DIAGNOSIS — I25.2 HISTORY OF ST ELEVATION MYOCARDIAL INFARCTION (STEMI): ICD-10-CM

## 2024-11-07 DIAGNOSIS — I21.02 ST ELEVATION (STEMI) MYOCARDIAL INFARCTION INVOLVING LEFT ANTERIOR DESCENDING CORONARY ARTERY (MULTI): ICD-10-CM

## 2024-11-07 DIAGNOSIS — I10 HYPERTENSION, ESSENTIAL, BENIGN: ICD-10-CM

## 2024-11-07 DIAGNOSIS — I25.10 CORONARY ARTERY DISEASE INVOLVING NATIVE CORONARY ARTERY OF NATIVE HEART WITHOUT ANGINA PECTORIS: Primary | ICD-10-CM

## 2024-11-07 DIAGNOSIS — I10 ESSENTIAL (PRIMARY) HYPERTENSION: ICD-10-CM

## 2024-11-07 DIAGNOSIS — I25.10 ATHEROSCLEROTIC HEART DISEASE OF NATIVE CORONARY ARTERY WITHOUT ANGINA PECTORIS: ICD-10-CM

## 2024-11-07 PROCEDURE — 1036F TOBACCO NON-USER: CPT | Performed by: PHYSICIAN ASSISTANT

## 2024-11-07 PROCEDURE — 3079F DIAST BP 80-89 MM HG: CPT | Performed by: PHYSICIAN ASSISTANT

## 2024-11-07 PROCEDURE — 4010F ACE/ARB THERAPY RXD/TAKEN: CPT | Performed by: PHYSICIAN ASSISTANT

## 2024-11-07 PROCEDURE — 99213 OFFICE O/P EST LOW 20 MIN: CPT | Performed by: PHYSICIAN ASSISTANT

## 2024-11-07 PROCEDURE — 3075F SYST BP GE 130 - 139MM HG: CPT | Performed by: PHYSICIAN ASSISTANT

## 2024-11-07 PROCEDURE — 3008F BODY MASS INDEX DOCD: CPT | Performed by: PHYSICIAN ASSISTANT

## 2024-11-07 RX ORDER — LISINOPRIL 10 MG/1
10 TABLET ORAL DAILY
Qty: 90 TABLET | Refills: 3 | Status: SHIPPED | OUTPATIENT
Start: 2024-11-07 | End: 2025-11-07

## 2024-11-07 RX ORDER — METOPROLOL SUCCINATE 200 MG/1
200 TABLET, EXTENDED RELEASE ORAL DAILY
Qty: 90 TABLET | Refills: 3 | Status: SHIPPED | OUTPATIENT
Start: 2024-11-07 | End: 2025-11-07

## 2024-11-07 RX ORDER — ATORVASTATIN CALCIUM 80 MG/1
80 TABLET, FILM COATED ORAL DAILY
Qty: 90 TABLET | Refills: 3 | Status: SHIPPED | OUTPATIENT
Start: 2024-11-07 | End: 2025-11-07

## 2024-11-07 ASSESSMENT — ENCOUNTER SYMPTOMS
FEVER: 0
NAUSEA: 0
DYSURIA: 0
WHEEZING: 0
VOMITING: 0
ABDOMINAL PAIN: 0
PALPITATIONS: 0
ORTHOPNEA: 0
SHORTNESS OF BREATH: 0
WEAKNESS: 0
DIARRHEA: 0

## 2024-11-07 NOTE — PROGRESS NOTES
Cardiology Follow Up  Chief Complaint:   Patient is here for 6 month office visit.      History Of Present Illness:    Juancarlos Valle is a 53 y.o. male presenting for yearly follow up    h/o CAD s/p anterior STEMI 5/19/18 s/p PCI with KAREN to prox-mid LAD and POBA distal LAD, Preserved LVEF 55%, moderate upper septal hypertrophy, mild eccentric AR, DL/hypertriglyceridemia, DM2 who presents for annual follow up.      No chest pain/pressure.  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  With this no chest pain/sob.  No LE edema, orthopnea.  No palps, LH/dizziness, near syncope, syncope.  Does not smoke tobacco.       ROS:  The remainder of the review of systems was obtained, as was negative as pertains to the chief complaint.     CV testing :  Lipid labs 11/2023  chol 108, HDL 36.8  LDL 25  trig 233, CMP  K 4.2  BUN 12 crea 0.72  ast 18  alt 33     Stress test 6/2018:  Normal exercise stress test, below average exercise capacity.     TTE 5/2018  EF 55% stage I DD, moderate upper septal hypertrophy, mild aortic regurg , trace tricuspid regurg,      [facility] 5/2018  PCI  mid LAD     Prior history:   The patient presented in 5/2018 with c/o chest pain waking him from sleep,with asociated SOB, dizziness, clammy sensation, diaphoresis. [facility] cor angio demonstrated acute prox-mid LAD plaque rupture, with 100% thrombotic occlusion of the distal LAD. S/p successful PCI with aspiration thrombectomy, Xience Alpine 3.5 x 23mm KAREN to prox-mid LAD, postdilated with NC 3.5mm balloon at 20 ayla, then 3.75mm balloon at 22 ayla; as well as POBA to distal LAD with improvement in blood flow. His TTE on 5/22/18 demonstrated EF 55%, moderate upper septal hypertrophy with normal LV thickness, mild eccentric AR, trace TR.   11-7-24: This a pleasant 54-year-old patient known to Dr. Islas.  He presents for 6-month follow-up.  In the last 6 months no change in cardiovascular status.  He works at [employer] and does quite a bit of  "walking and lifting without chest pain shortness of breath palpitations lightheadedness dizziness or syncope.  Unfortunately has stopped taking his atorvastatin lisinopril and metoprolol due to cost of the medication but I did refill these medications and gave him a good Rx card to see if we can reduce his cost.  He does not smoke.  Denies any other new cardiac complaints or concerns at this time.       Last Recorded Vitals:  Vitals:    11/07/24 0832   BP: 130/84   BP Location: Left arm   Patient Position: Sitting   BP Cuff Size: Large adult   Pulse: 102   SpO2: 95%   Weight: 119 kg (262 lb)   Height: 1.676 m (5' 6\")       Past Medical History:  He has a past medical history of Diabetes mellitus (Multi), Hyperlipidemia, Hypertension, MI (myocardial infarction) (Multi), Personal history of other diseases of the digestive system, and Personal history of other endocrine, nutritional and metabolic disease.    Past Surgical History:  He has a past surgical history that includes Tympanostomy tube placement (06/05/2018); Ankle surgery (06/05/2018); Hernia repair (06/05/2018); Other surgical history (05/25/2018); and Cardiac catheterization.      Social History:  He reports that he has quit smoking. His smoking use included cigarettes. He has never used smokeless tobacco. He reports that he does not currently use alcohol. He reports that he does not use drugs.    Family History:  Family History   Problem Relation Name Age of Onset    Scleroderma Mother      Crohn's disease Brother          Allergies:  Levofloxacin    Outpatient Medications:  Current Outpatient Medications   Medication Instructions    aspirin 81 mg EC tablet 1 tablet, Daily    atorvastatin (LIPITOR) 80 mg, oral, Daily    cholecalciferol (Vitamin D-3) 50 MCG (2000 UT) tablet 1 tablet, Daily    lisinopril 10 mg, oral, Daily    metFORMIN (GLUCOPHAGE) 1,000 mg, oral, 2 times daily (morning and late afternoon)    metoprolol succinate XL (TOPROL-XL) 200 mg, oral, " Daily    nitroglycerin (NITROSTAT) 0.4 mg, sublingual, Every 5 min PRN    omeprazole OTC (PriLOSEC OTC) 20 mg EC tablet 1 tablet, Daily    Ozempic 2 mg, subcutaneous, Once Weekly     Review of Systems   Constitutional: Negative for fever and malaise/fatigue.   Cardiovascular:  Negative for chest pain, orthopnea and palpitations.   Respiratory:  Negative for shortness of breath and wheezing.    Skin:  Negative for itching and rash.   Gastrointestinal:  Negative for abdominal pain, diarrhea, nausea and vomiting.   Genitourinary:  Negative for dysuria.   Neurological:  Negative for weakness.      Physical Exam  Constitutional:       General: He is not in acute distress.     Appearance: Normal appearance.   HENT:      Mouth/Throat:      Mouth: Mucous membranes are moist.   Neck:      Comments: No obvious JVD  Cardiovascular:      Rate and Rhythm: Normal rate and regular rhythm.      Heart sounds: Normal heart sounds. No murmur heard.  Abdominal:      General: Abdomen is flat. Bowel sounds are normal.      Palpations: Abdomen is soft.   Musculoskeletal:         General: No swelling.   Skin:     General: Skin is warm and dry.   Neurological:      Mental Status: He is alert and oriented to person, place, and time.   Psychiatric:         Mood and Affect: Mood normal.           Last Labs:  CBC -  Lab Results   Component Value Date    WBC 10.5 11/01/2023    HGB 15.9 11/01/2023    HCT 46.8 11/01/2023    MCV 92 11/01/2023     11/01/2023       CMP -  Lab Results   Component Value Date    CALCIUM 9.1 11/01/2023    PROT 5.9 (L) 11/01/2023    ALBUMIN 4.1 11/01/2023    AST 18 11/01/2023    ALT 33 11/01/2023    ALKPHOS 124 (H) 11/01/2023    BILITOT 1.4 (H) 11/01/2023       LIPID PANEL -   Lab Results   Component Value Date    CHOL 108 11/01/2023    TRIG 233 (H) 11/01/2023    HDL 36.8 11/01/2023    CHHDL 2.9 11/01/2023    LDLF 65 02/21/2022    VLDL 47 (H) 11/01/2023    NHDL 71 11/01/2023       RENAL FUNCTION PANEL -   Lab  Results   Component Value Date    GLUCOSE 254 (H) 11/01/2023     (L) 11/01/2023    K 4.2 11/01/2023     11/01/2023    CO2 25 11/01/2023    ANIONGAP 14 11/01/2023    BUN 12 11/01/2023    CREATININE 0.72 11/01/2023    GFRMALE >90 01/23/2023    CALCIUM 9.1 11/01/2023    ALBUMIN 4.1 11/01/2023        Lab Results   Component Value Date    HGBA1C 10.9 (H) 11/01/2023       Last Cardiology Tests:    Echo: 2018--normal LVSF.      Cath: 2018--STEMI and PCI LAD      No recent results to review    Assessment/Plan   Problem List Items Addressed This Visit             ICD-10-CM       Cardiac and Vasculature    CAD (coronary artery disease) - Primary I25.10    Relevant Medications    metoprolol succinate XL (Toprol-XL) 200 mg 24 hr tablet    H/O heart artery stent Z95.5    Mixed hyperlipidemia E78.2    History of ST elevation myocardial infarction (STEMI) I25.2    Hypertension, essential, benign I10     Other Visit Diagnoses         Codes    Essential (primary) hypertension     I10    Relevant Medications    lisinopril 10 mg tablet    Atherosclerotic heart disease of native coronary artery without angina pectoris     I25.10    Relevant Medications    metoprolol succinate XL (Toprol-XL) 200 mg 24 hr tablet    ST elevation (STEMI) myocardial infarction involving left anterior descending coronary artery (Multi)     I21.02    Relevant Medications    atorvastatin (Lipitor) 80 mg tablet    metoprolol succinate XL (Toprol-XL) 200 mg 24 hr tablet        ASHD/history of ST elevation MI--patient denies any chest pain or anginal symptoms.  Again he does remain very active during his work activities without any exertional complaints or concerns.  Will get him back on his atorvastatin, lisinopril and metoprolol and gave him a good Rx card and sent new prescriptions to his pharmacy and hopefully this will help with his cost.  I did inform him that he needs to have his metformin and Ozempic refilled by PCP.  Previously LV systolic  function was normal.  Hypertension--blood pressure slightly elevated but he just got off work and has not had his meds which again were refilled.  If the patient is still having a problem with cost of medications he would like to switch pharmacies and we can send prescriptions to what ever pharmacy he would like.  Elevated triglycerides--patient counseled regarding better dietary choices and also good control of his diabetes but again he has not been on metformin or his Ozempic.  He needs to get those medications refilled through his PCP.  Overall patient is doing well but frustrated over the cost of the medications but we will refill to his pharmacy and gave him a good Rx card to hopefully save him some money.  If the patient continues to have issues and would like to try different pharmacy he needs to call the office.  Will otherwise arrange follow-up with Dr. Galo in 6 months time.      Kb Manley PA-C  11/7/2024  8:46 AM

## 2024-11-07 NOTE — TELEPHONE ENCOUNTER
Patient asked if he needs to fill any blood work for Dr. Islas at checkout today. Patient has no outstanding orders at this time. Patient is asking for a call from a provider to confirm.

## 2024-11-07 NOTE — TELEPHONE ENCOUNTER
Called patient after reviewing today's office note.  He has not been taking his meds for awhile. Told him that if he does end up going back on his meds then when he does see his PCP in the near future that would be about the time to have his labs done and he should be able  order labs for him to get done. He expressed understanding.

## 2024-12-05 ENCOUNTER — APPOINTMENT (OUTPATIENT)
Dept: PRIMARY CARE | Facility: CLINIC | Age: 54
End: 2024-12-05
Payer: COMMERCIAL

## 2024-12-05 VITALS
DIASTOLIC BLOOD PRESSURE: 70 MMHG | WEIGHT: 265 LBS | OXYGEN SATURATION: 95 % | HEART RATE: 85 BPM | SYSTOLIC BLOOD PRESSURE: 110 MMHG | BODY MASS INDEX: 42.77 KG/M2

## 2024-12-05 DIAGNOSIS — Z12.11 SCREENING FOR COLORECTAL CANCER: ICD-10-CM

## 2024-12-05 DIAGNOSIS — E11.69 TYPE 2 DIABETES MELLITUS WITH OTHER SPECIFIED COMPLICATION, UNSPECIFIED WHETHER LONG TERM INSULIN USE (MULTI): ICD-10-CM

## 2024-12-05 DIAGNOSIS — Z00.00 ANNUAL PHYSICAL EXAM: Primary | ICD-10-CM

## 2024-12-05 DIAGNOSIS — G47.33 OBSTRUCTIVE SLEEP APNEA SYNDROME: ICD-10-CM

## 2024-12-05 DIAGNOSIS — E78.2 MIXED HYPERLIPIDEMIA: ICD-10-CM

## 2024-12-05 DIAGNOSIS — E66.813 CLASS 3 SEVERE OBESITY DUE TO EXCESS CALORIES WITH SERIOUS COMORBIDITY AND BODY MASS INDEX (BMI) OF 40.0 TO 44.9 IN ADULT: ICD-10-CM

## 2024-12-05 DIAGNOSIS — E66.01 CLASS 3 SEVERE OBESITY DUE TO EXCESS CALORIES WITH SERIOUS COMORBIDITY AND BODY MASS INDEX (BMI) OF 40.0 TO 44.9 IN ADULT: ICD-10-CM

## 2024-12-05 DIAGNOSIS — I25.10 CORONARY ARTERY DISEASE INVOLVING NATIVE CORONARY ARTERY OF NATIVE HEART WITHOUT ANGINA PECTORIS: ICD-10-CM

## 2024-12-05 DIAGNOSIS — I10 HYPERTENSION, ESSENTIAL, BENIGN: ICD-10-CM

## 2024-12-05 DIAGNOSIS — Z12.5 SCREENING FOR PROSTATE CANCER: ICD-10-CM

## 2024-12-05 DIAGNOSIS — Z12.12 SCREENING FOR COLORECTAL CANCER: ICD-10-CM

## 2024-12-05 DIAGNOSIS — E55.9 VITAMIN D DEFICIENCY: ICD-10-CM

## 2024-12-05 DIAGNOSIS — I25.2 HISTORY OF ST ELEVATION MYOCARDIAL INFARCTION (STEMI): ICD-10-CM

## 2024-12-05 LAB — POC HEMOGLOBIN A1C: 13.7 % (ref 4.2–6.5)

## 2024-12-05 PROCEDURE — 82043 UR ALBUMIN QUANTITATIVE: CPT

## 2024-12-05 PROCEDURE — 1036F TOBACCO NON-USER: CPT | Performed by: NURSE PRACTITIONER

## 2024-12-05 PROCEDURE — 3074F SYST BP LT 130 MM HG: CPT | Performed by: NURSE PRACTITIONER

## 2024-12-05 PROCEDURE — 99396 PREV VISIT EST AGE 40-64: CPT | Performed by: NURSE PRACTITIONER

## 2024-12-05 PROCEDURE — 4010F ACE/ARB THERAPY RXD/TAKEN: CPT | Performed by: NURSE PRACTITIONER

## 2024-12-05 PROCEDURE — 3078F DIAST BP <80 MM HG: CPT | Performed by: NURSE PRACTITIONER

## 2024-12-05 PROCEDURE — 83036 HEMOGLOBIN GLYCOSYLATED A1C: CPT | Performed by: NURSE PRACTITIONER

## 2024-12-05 PROCEDURE — 99214 OFFICE O/P EST MOD 30 MIN: CPT | Performed by: NURSE PRACTITIONER

## 2024-12-05 PROCEDURE — 82570 ASSAY OF URINE CREATININE: CPT

## 2024-12-05 RX ORDER — METFORMIN HYDROCHLORIDE 1000 MG/1
1000 TABLET ORAL
Qty: 200 TABLET | Refills: 3 | Status: SHIPPED | OUTPATIENT
Start: 2024-12-05 | End: 2026-01-09

## 2024-12-05 RX ORDER — GLIMEPIRIDE 2 MG/1
2 TABLET ORAL 2 TIMES DAILY
Qty: 180 TABLET | Refills: 0 | Status: SHIPPED | OUTPATIENT
Start: 2024-12-05 | End: 2025-03-05

## 2024-12-05 RX ORDER — ERGOCALCIFEROL 1.25 MG/1
50000 CAPSULE ORAL WEEKLY
Qty: 12 CAPSULE | Refills: 2 | Status: SHIPPED | OUTPATIENT
Start: 2024-12-05 | End: 2025-09-01

## 2024-12-05 ASSESSMENT — ANXIETY QUESTIONNAIRES
7. FEELING AFRAID AS IF SOMETHING AWFUL MIGHT HAPPEN: NOT AT ALL
5. BEING SO RESTLESS THAT IT IS HARD TO SIT STILL: NOT AT ALL
1. FEELING NERVOUS, ANXIOUS, OR ON EDGE: NOT AT ALL
4. TROUBLE RELAXING: NOT AT ALL
6. BECOMING EASILY ANNOYED OR IRRITABLE: NOT AT ALL
GAD7 TOTAL SCORE: 0
3. WORRYING TOO MUCH ABOUT DIFFERENT THINGS: NOT AT ALL
2. NOT BEING ABLE TO STOP OR CONTROL WORRYING: NOT AT ALL

## 2024-12-05 ASSESSMENT — ENCOUNTER SYMPTOMS
LOSS OF SENSATION IN FEET: 0
OCCASIONAL FEELINGS OF UNSTEADINESS: 0
DEPRESSION: 0

## 2024-12-05 ASSESSMENT — PATIENT HEALTH QUESTIONNAIRE - PHQ9
1. LITTLE INTEREST OR PLEASURE IN DOING THINGS: NOT AT ALL
SUM OF ALL RESPONSES TO PHQ9 QUESTIONS 1 AND 2: 0
2. FEELING DOWN, DEPRESSED OR HOPELESS: NOT AT ALL

## 2024-12-05 NOTE — PATIENT INSTRUCTIONS
Your hemoglobin A1c is very elevated at 10.9 which indicates uncontrolled diabetes and your vitamin D level is low at 19.  I have started you on metformin, glimepiride and vitamin D with labs ordered to be completed a few days prior to 3 months follow-up.

## 2024-12-05 NOTE — PROGRESS NOTES
Subjective   Patient ID: Juancarlos Valle is a 54 y.o. male who presents for 6 mo follolw up.    Patient is following up for annual physical exam, lab results review and management of type 2 diabetes mellitus, morbid obesity, hypertension and vitamin D deficiency.  He is in office hemoglobin A1c equals 13.7% and his most recent labs shows low serum vitamin D level of 19.  Reports  since his insurance stopped paying for Ozempic, he has not been taking any medication for diabetes.  He follows with cardiology for management of multiple cardiac comorbidities.  He denies acute medical complaint.         Review of Systems   All other systems reviewed and are negative.      Objective   Wt 120 kg (265 lb)   BMI 42.77 kg/m²     Physical Exam  Vitals reviewed.   Constitutional:       Appearance: Normal appearance.   HENT:      Head: Normocephalic and atraumatic.      Right Ear: External ear normal.      Left Ear: External ear normal.      Nose: Nose normal.      Mouth/Throat:      Mouth: Mucous membranes are moist.   Cardiovascular:      Rate and Rhythm: Normal rate and regular rhythm.      Pulses: Normal pulses.      Heart sounds: Normal heart sounds.   Pulmonary:      Effort: Pulmonary effort is normal.      Breath sounds: Normal breath sounds.   Abdominal:      General: Bowel sounds are normal.      Palpations: Abdomen is soft.      Comments: Obese   Musculoskeletal:      Cervical back: Neck supple.   Skin:     General: Skin is warm and dry.   Neurological:      General: No focal deficit present.      Mental Status: He is alert and oriented to person, place, and time.   Psychiatric:         Mood and Affect: Mood normal.         Behavior: Behavior normal.         Thought Content: Thought content normal.         Judgment: Judgment normal.         Assessment/Plan   Problem List Items Addressed This Visit       Type 2 diabetes mellitus    Relevant Orders    Albumin-Creatinine Ratio, Urine Random    POCT glycosylated hemoglobin (Hb  A1C) manually resulted

## 2024-12-06 LAB
CREAT UR-MCNC: 29.7 MG/DL (ref 20–370)
MICROALBUMIN UR-MCNC: <7 MG/L
MICROALBUMIN/CREAT UR: NORMAL MG/G{CREAT}

## 2024-12-09 ENCOUNTER — TELEPHONE (OUTPATIENT)
Facility: CLINIC | Age: 54
End: 2024-12-09
Payer: COMMERCIAL

## 2024-12-09 NOTE — TELEPHONE ENCOUNTER
----- Message from Nurse Jennifer SHRESTHA sent at 12/6/2024  2:41 PM EST -----  Regarding: Office Visit  Office Visit BMI

## 2025-01-17 ENCOUNTER — APPOINTMENT (OUTPATIENT)
Facility: CLINIC | Age: 55
End: 2025-01-17
Payer: COMMERCIAL

## 2025-01-17 VITALS
BODY MASS INDEX: 44.48 KG/M2 | OXYGEN SATURATION: 93 % | HEART RATE: 76 BPM | SYSTOLIC BLOOD PRESSURE: 113 MMHG | HEIGHT: 66 IN | WEIGHT: 276.8 LBS | DIASTOLIC BLOOD PRESSURE: 76 MMHG

## 2025-01-17 DIAGNOSIS — Z12.11 COLON CANCER SCREENING: Primary | ICD-10-CM

## 2025-01-17 PROCEDURE — 1036F TOBACCO NON-USER: CPT | Performed by: NURSE PRACTITIONER

## 2025-01-17 PROCEDURE — 3008F BODY MASS INDEX DOCD: CPT | Performed by: NURSE PRACTITIONER

## 2025-01-17 PROCEDURE — 4010F ACE/ARB THERAPY RXD/TAKEN: CPT | Performed by: NURSE PRACTITIONER

## 2025-01-17 PROCEDURE — 3078F DIAST BP <80 MM HG: CPT | Performed by: NURSE PRACTITIONER

## 2025-01-17 PROCEDURE — 99203 OFFICE O/P NEW LOW 30 MIN: CPT | Performed by: NURSE PRACTITIONER

## 2025-01-17 PROCEDURE — 3074F SYST BP LT 130 MM HG: CPT | Performed by: NURSE PRACTITIONER

## 2025-01-17 RX ORDER — POLYETHYLENE GLYCOL 3350, SODIUM SULFATE ANHYDROUS, SODIUM BICARBONATE, SODIUM CHLORIDE, POTASSIUM CHLORIDE 236; 22.74; 6.74; 5.86; 2.97 G/4L; G/4L; G/4L; G/4L; G/4L
4000 POWDER, FOR SOLUTION ORAL ONCE
Qty: 4000 ML | Refills: 0 | Status: SHIPPED | OUTPATIENT
Start: 2025-01-17 | End: 2025-01-17

## 2025-01-17 ASSESSMENT — ENCOUNTER SYMPTOMS
CONFUSION: 0
WEAKNESS: 0
COUGH: 0
SHORTNESS OF BREATH: 0
DIZZINESS: 0
SORE THROAT: 0
FEVER: 0
ARTHRALGIAS: 0
PALPITATIONS: 0
TROUBLE SWALLOWING: 0
CHILLS: 0
ROS GI COMMENTS: SEE HPI
DIFFICULTY URINATING: 0
JOINT SWELLING: 0
BRUISES/BLEEDS EASILY: 0
WOUND: 0
ADENOPATHY: 0

## 2025-01-17 NOTE — H&P (VIEW-ONLY)
Subjective   Patient ID: Juancarlos Valle is a 54 y.o. male with PMH of HTN, HLD, STEMI s/p PCI and stent to LAD (5/2018), DM2, GERD who was referred by No ref. provider found for Colon Cancer Screening (OA fail for BMI.  Referred by Eliseo Monaco.).     Patient's PCP is Eliseo Monaco, APRN-CNP     HPI  OA fail -- BMI  BMI: 44.68     Patient denies any current GI symptoms. Patient denies any unintended weight loss, nausea, vomiting, dysphagia, reflux, abdominal pain, melena, hematemesis, diarrhea, constipation, or rectal bleeding.      Patient follows with cardiology for HTN, HLD and history of STEMI/heart stent in 2018. He was most recently seen in November 2024. At that time, there were issues with his medications and he had been off of them for 3 months due to cost. That has since be straightened out and he is now taking all medications as prescribed again.     No prior scopes or other screening tests     Summary of endoscopies:      Social Hx:  Tobacco: none   Etoh: none   Recreational drug use: none   NSAIDs: rare       Family Hx:  Crohns -- half brother   No GI malignancy or pancreatitis     Review of Systems:  Review of Systems   Constitutional:  Negative for chills and fever.   HENT:  Negative for sore throat and trouble swallowing.    Respiratory:  Negative for cough and shortness of breath.    Cardiovascular:  Negative for chest pain and palpitations.   Gastrointestinal:         SEE HPI   Endocrine: Negative for cold intolerance and heat intolerance.   Genitourinary:  Negative for difficulty urinating.   Musculoskeletal:  Negative for arthralgias and joint swelling.   Skin:  Negative for rash and wound.   Neurological:  Negative for dizziness and weakness.   Hematological:  Negative for adenopathy. Does not bruise/bleed easily.   Psychiatric/Behavioral:  Negative for confusion.         Medications:  Prior to Admission medications    Medication Sig Start Date End Date Taking? Authorizing Provider   aspirin 81  mg EC tablet Take 1 tablet (81 mg) by mouth once daily.    Historical Provider, MD   atorvastatin (Lipitor) 80 mg tablet Take 1 tablet (80 mg) by mouth once daily. 11/7/24 11/7/25  Kb Manley PA-C   cholecalciferol (Vitamin D-3) 50 MCG (2000 UT) tablet Take 1 tablet (2,000 Units) by mouth once daily. 10/2/18   Historical Provider, MD   ergocalciferol (Vitamin D-2) 1.25 MG (84664 UT) capsule Take 1 capsule (50,000 Units) by mouth 1 (one) time per week. 12/5/24 9/1/25  LUCA Lemus   glimepiride (AmaryL) 2 mg tablet Take 1 tablet (2 mg) by mouth 2 times a day. 12/5/24 3/5/25  LUCA Lemus   lisinopril 10 mg tablet Take 1 tablet (10 mg) by mouth once daily. 11/7/24 11/7/25  Kb Manley PA-C   metFORMIN (Glucophage) 1,000 mg tablet Take 1 tablet (1,000 mg) by mouth 2 times daily (morning and late afternoon). 12/5/24 1/9/26  LUCA Lemus   metoprolol succinate XL (Toprol-XL) 200 mg 24 hr tablet Take 1 tablet (200 mg) by mouth once daily. 11/7/24 11/7/25  Kb Manley PA-C   nitroglycerin (Nitrostat) 0.4 mg SL tablet Place 1 tablet (0.4 mg) under the tongue every 5 minutes if needed for chest pain. 5/7/24 5/7/25  Luis Islas MD   omeprazole OTC (PriLOSEC OTC) 20 mg EC tablet Take 1 tablet (20 mg) by mouth once daily.    Historical Provider, MD       Allergies:  Levofloxacin    Past Medical History:  He has a past medical history of Diabetes mellitus (Multi), Hyperlipidemia, Hypertension, MI (myocardial infarction) (Multi), Personal history of other diseases of the digestive system, and Personal history of other endocrine, nutritional and metabolic disease.    Past Surgical History:  He has a past surgical history that includes Tympanostomy tube placement (06/05/2018); Ankle surgery (06/05/2018); Hernia repair (06/05/2018); Other surgical history (05/25/2018); and Cardiac catheterization.    Social History:  He reports that he has quit smoking. His smoking use included  cigarettes. He has never used smokeless tobacco. He reports that he does not currently use alcohol. He reports that he does not use drugs.    Objective   Physical exam:  Physical Exam  Constitutional:       General: He is not in acute distress.     Appearance: Normal appearance.   HENT:      Mouth/Throat:      Mouth: Mucous membranes are moist.      Comments: pink  Eyes:      Conjunctiva/sclera: Conjunctivae normal.      Pupils: Pupils are equal, round, and reactive to light.   Cardiovascular:      Rate and Rhythm: Normal rate and regular rhythm.      Heart sounds: No murmur heard.  Pulmonary:      Effort: Pulmonary effort is normal.      Breath sounds: Normal breath sounds.   Abdominal:      General: Bowel sounds are normal. There is no distension.      Palpations: Abdomen is soft.      Tenderness: There is no abdominal tenderness. There is no guarding.      Comments: Diastasis recti    Skin:     General: Skin is warm and dry.      Coloration: Skin is not jaundiced.   Neurological:      Mental Status: He is alert and oriented to person, place, and time.   Psychiatric:         Mood and Affect: Mood normal.         Behavior: Behavior normal.          Assessment/Plan     Colon cancer screening   Will schedule for colonoscopy in OR due to borderline BMI (44.7). Discussed risks/benefits of procedure. No meds to hold prior.          Tyesha Roberts, APRN-CNP

## 2025-01-17 NOTE — PROGRESS NOTES
Subjective   Patient ID: Juancarlos Valle is a 54 y.o. male with PMH of HTN, HLD, STEMI s/p PCI and stent to LAD (5/2018), DM2, GERD who was referred by No ref. provider found for Colon Cancer Screening (OA fail for BMI.  Referred by Eliseo Monaco.).     Patient's PCP is Eliseo Monaco, APRN-CNP     HPI  OA fail -- BMI  BMI: 44.68     Patient denies any current GI symptoms. Patient denies any unintended weight loss, nausea, vomiting, dysphagia, reflux, abdominal pain, melena, hematemesis, diarrhea, constipation, or rectal bleeding.      Patient follows with cardiology for HTN, HLD and history of STEMI/heart stent in 2018. He was most recently seen in November 2024. At that time, there were issues with his medications and he had been off of them for 3 months due to cost. That has since be straightened out and he is now taking all medications as prescribed again.     No prior scopes or other screening tests     Summary of endoscopies:      Social Hx:  Tobacco: none   Etoh: none   Recreational drug use: none   NSAIDs: rare       Family Hx:  Crohns -- half brother   No GI malignancy or pancreatitis     Review of Systems:  Review of Systems   Constitutional:  Negative for chills and fever.   HENT:  Negative for sore throat and trouble swallowing.    Respiratory:  Negative for cough and shortness of breath.    Cardiovascular:  Negative for chest pain and palpitations.   Gastrointestinal:         SEE HPI   Endocrine: Negative for cold intolerance and heat intolerance.   Genitourinary:  Negative for difficulty urinating.   Musculoskeletal:  Negative for arthralgias and joint swelling.   Skin:  Negative for rash and wound.   Neurological:  Negative for dizziness and weakness.   Hematological:  Negative for adenopathy. Does not bruise/bleed easily.   Psychiatric/Behavioral:  Negative for confusion.         Medications:  Prior to Admission medications    Medication Sig Start Date End Date Taking? Authorizing Provider   aspirin 81  mg EC tablet Take 1 tablet (81 mg) by mouth once daily.    Historical Provider, MD   atorvastatin (Lipitor) 80 mg tablet Take 1 tablet (80 mg) by mouth once daily. 11/7/24 11/7/25  Kb Manley PA-C   cholecalciferol (Vitamin D-3) 50 MCG (2000 UT) tablet Take 1 tablet (2,000 Units) by mouth once daily. 10/2/18   Historical Provider, MD   ergocalciferol (Vitamin D-2) 1.25 MG (85359 UT) capsule Take 1 capsule (50,000 Units) by mouth 1 (one) time per week. 12/5/24 9/1/25  LUCA Lemus   glimepiride (AmaryL) 2 mg tablet Take 1 tablet (2 mg) by mouth 2 times a day. 12/5/24 3/5/25  LUCA Lemus   lisinopril 10 mg tablet Take 1 tablet (10 mg) by mouth once daily. 11/7/24 11/7/25  Kb Manley PA-C   metFORMIN (Glucophage) 1,000 mg tablet Take 1 tablet (1,000 mg) by mouth 2 times daily (morning and late afternoon). 12/5/24 1/9/26  LUCA Lemus   metoprolol succinate XL (Toprol-XL) 200 mg 24 hr tablet Take 1 tablet (200 mg) by mouth once daily. 11/7/24 11/7/25  Kb Manley PA-C   nitroglycerin (Nitrostat) 0.4 mg SL tablet Place 1 tablet (0.4 mg) under the tongue every 5 minutes if needed for chest pain. 5/7/24 5/7/25  Luis Islas MD   omeprazole OTC (PriLOSEC OTC) 20 mg EC tablet Take 1 tablet (20 mg) by mouth once daily.    Historical Provider, MD       Allergies:  Levofloxacin    Past Medical History:  He has a past medical history of Diabetes mellitus (Multi), Hyperlipidemia, Hypertension, MI (myocardial infarction) (Multi), Personal history of other diseases of the digestive system, and Personal history of other endocrine, nutritional and metabolic disease.    Past Surgical History:  He has a past surgical history that includes Tympanostomy tube placement (06/05/2018); Ankle surgery (06/05/2018); Hernia repair (06/05/2018); Other surgical history (05/25/2018); and Cardiac catheterization.    Social History:  He reports that he has quit smoking. His smoking use included  cigarettes. He has never used smokeless tobacco. He reports that he does not currently use alcohol. He reports that he does not use drugs.    Objective   Physical exam:  Physical Exam  Constitutional:       General: He is not in acute distress.     Appearance: Normal appearance.   HENT:      Mouth/Throat:      Mouth: Mucous membranes are moist.      Comments: pink  Eyes:      Conjunctiva/sclera: Conjunctivae normal.      Pupils: Pupils are equal, round, and reactive to light.   Cardiovascular:      Rate and Rhythm: Normal rate and regular rhythm.      Heart sounds: No murmur heard.  Pulmonary:      Effort: Pulmonary effort is normal.      Breath sounds: Normal breath sounds.   Abdominal:      General: Bowel sounds are normal. There is no distension.      Palpations: Abdomen is soft.      Tenderness: There is no abdominal tenderness. There is no guarding.      Comments: Diastasis recti    Skin:     General: Skin is warm and dry.      Coloration: Skin is not jaundiced.   Neurological:      Mental Status: He is alert and oriented to person, place, and time.   Psychiatric:         Mood and Affect: Mood normal.         Behavior: Behavior normal.          Assessment/Plan     Colon cancer screening   Will schedule for colonoscopy in OR due to borderline BMI (44.7). Discussed risks/benefits of procedure. No meds to hold prior.          Tyesha Roberts, APRN-CNP

## 2025-01-17 NOTE — PATIENT INSTRUCTIONS
Thank you for coming to your appointment today   - You will be scheduled for a colonoscopy in the surgery center   - Please follow the bowel prep instructions given to you by the office.   - After your procedure, you can expect to speak to the physician to go over the initial results of the procedure.   - If any polyps are removed during your procedure or if any biopsies are obtained those specimens will go to the pathologists to review under the microscope. Once those results are available they will be sent to the physician electronically to review. These results will also be available to you at that time through the patient portal. These results will be reviewed by the physician and communicated back to you with final recommendations. If you have questions or need additional information I urge you to call the office at [phone], but we do ask for patience as the we are often with patients.   - You were also given information regarding the schedule for your procedure including the time that you need to arrive to the endoscopy unit.  You will also be contacted about 1 week prior to your procedure to confirm the final arrival time.  If you have questions about this or if you need to cancel or change this appointment please call my office at [phone].      Please call [phone] with any questions or concerns

## 2025-01-24 ENCOUNTER — PRE-ADMISSION TESTING (OUTPATIENT)
Dept: PREADMISSION TESTING | Facility: HOSPITAL | Age: 55
End: 2025-01-24
Payer: COMMERCIAL

## 2025-01-24 ENCOUNTER — LAB (OUTPATIENT)
Dept: LAB | Facility: LAB | Age: 55
End: 2025-01-24
Payer: COMMERCIAL

## 2025-01-24 VITALS
DIASTOLIC BLOOD PRESSURE: 78 MMHG | RESPIRATION RATE: 22 BRPM | HEIGHT: 65 IN | TEMPERATURE: 97 F | SYSTOLIC BLOOD PRESSURE: 136 MMHG | OXYGEN SATURATION: 97 % | WEIGHT: 276.4 LBS | BODY MASS INDEX: 46.05 KG/M2 | HEART RATE: 85 BPM

## 2025-01-24 DIAGNOSIS — Z01.818 PRE-OP EVALUATION: Primary | ICD-10-CM

## 2025-01-24 DIAGNOSIS — I25.2 HISTORY OF ST ELEVATION MYOCARDIAL INFARCTION (STEMI): ICD-10-CM

## 2025-01-24 DIAGNOSIS — Z12.11 SCREENING FOR MALIGNANT NEOPLASM OF COLON: ICD-10-CM

## 2025-01-24 DIAGNOSIS — E55.9 VITAMIN D DEFICIENCY: ICD-10-CM

## 2025-01-24 DIAGNOSIS — I25.10 CORONARY ARTERY DISEASE INVOLVING NATIVE CORONARY ARTERY OF NATIVE HEART WITHOUT ANGINA PECTORIS: ICD-10-CM

## 2025-01-24 DIAGNOSIS — E11.69 TYPE 2 DIABETES MELLITUS WITH OTHER SPECIFIED COMPLICATION, UNSPECIFIED WHETHER LONG TERM INSULIN USE (MULTI): ICD-10-CM

## 2025-01-24 DIAGNOSIS — Z12.5 SCREENING FOR PROSTATE CANCER: ICD-10-CM

## 2025-01-24 LAB
ANION GAP SERPL CALC-SCNC: 14 MMOL/L (ref 10–20)
BUN SERPL-MCNC: 13 MG/DL (ref 6–23)
CALCIUM SERPL-MCNC: 8.9 MG/DL (ref 8.6–10.3)
CHLORIDE SERPL-SCNC: 100 MMOL/L (ref 98–107)
CO2 SERPL-SCNC: 23 MMOL/L (ref 21–32)
CREAT SERPL-MCNC: 0.71 MG/DL (ref 0.5–1.3)
EGFRCR SERPLBLD CKD-EPI 2021: >90 ML/MIN/1.73M*2
ERYTHROCYTE [DISTWIDTH] IN BLOOD BY AUTOMATED COUNT: 11.9 % (ref 11.5–14.5)
GLUCOSE SERPL-MCNC: 310 MG/DL (ref 74–99)
HCT VFR BLD AUTO: 44.7 % (ref 41–52)
HGB BLD-MCNC: 15.5 G/DL (ref 13.5–17.5)
MCH RBC QN AUTO: 31.1 PG (ref 26–34)
MCHC RBC AUTO-ENTMCNC: 34.7 G/DL (ref 32–36)
MCV RBC AUTO: 90 FL (ref 80–100)
NRBC BLD-RTO: 0 /100 WBCS (ref 0–0)
PLATELET # BLD AUTO: 265 X10*3/UL (ref 150–450)
POTASSIUM SERPL-SCNC: 4.1 MMOL/L (ref 3.5–5.3)
RBC # BLD AUTO: 4.99 X10*6/UL (ref 4.5–5.9)
SODIUM SERPL-SCNC: 133 MMOL/L (ref 136–145)
WBC # BLD AUTO: 9.5 X10*3/UL (ref 4.4–11.3)

## 2025-01-24 PROCEDURE — 80048 BASIC METABOLIC PNL TOTAL CA: CPT

## 2025-01-24 PROCEDURE — 36415 COLL VENOUS BLD VENIPUNCTURE: CPT

## 2025-01-24 PROCEDURE — 99203 OFFICE O/P NEW LOW 30 MIN: CPT | Performed by: CLINICAL NURSE SPECIALIST

## 2025-01-24 PROCEDURE — 85027 COMPLETE CBC AUTOMATED: CPT

## 2025-01-24 ASSESSMENT — DUKE ACTIVITY SCORE INDEX (DASI)
CAN YOU DO MODERATE WORK AROUND THE HOUSE LIKE VACUUMING, SWEEPING FLOORS OR CARRYING GROCERIES: YES
CAN YOU TAKE CARE OF YOURSELF (EAT, DRESS, BATHE, OR USE TOILET): YES
CAN YOU DO YARD WORK LIKE RAKING LEAVES, WEEDING OR PUSHING A MOWER: YES
CAN YOU WALK A BLOCK OR TWO ON LEVEL GROUND: YES
CAN YOU RUN A SHORT DISTANCE: NO
CAN YOU PARTICIPATE IN MODERATE RECREATIONAL ACTIVITIES LIKE GOLF, BOWLING, DANCING, DOUBLES TENNIS OR THROWING A BASEBALL OR FOOTBALL: NO
CAN YOU PARTICIPATE IN STRENOUS SPORTS LIKE SWIMMING, SINGLES TENNIS, FOOTBALL, BASKETBALL, OR SKIING: NO
CAN YOU DO HEAVY WORK AROUND THE HOUSE LIKE SCRUBBING FLOORS OR LIFTING AND MOVING HEAVY FURNITURE: YES
CAN YOU DO LIGHT WORK AROUND THE HOUSE LIKE DUSTING OR WASHING DISHES: YES
CAN YOU CLIMB A FLIGHT OF STAIRS OR WALK UP A HILL: YES
CAN YOU WALK INDOORS, SUCH AS AROUND YOUR HOUSE: YES

## 2025-01-24 ASSESSMENT — LIFESTYLE VARIABLES: SMOKING_STATUS: NONSMOKER

## 2025-01-24 NOTE — CPM/PAT H&P
CPM/PAT Evaluation       Name: Juancarlos Valle (Juancarlos Valle)  /Age: 1970/54 y.o.     In-Person       Chief Complaint: Scheduled for colonoscopy under MAC anesthesia per Dr. Yates on 25.       Past Medical History:   Diagnosis Date    Diabetes mellitus (Multi)     Hyperlipidemia     Hypertension     MI (myocardial infarction) (Multi)     Personal history of other diseases of the digestive system     History of gastroesophageal reflux (GERD)    Personal history of other endocrine, nutritional and metabolic disease     History of obesity       Past Surgical History:   Procedure Laterality Date    ANKLE SURGERY  2018    Ankle Surgery    CARDIAC CATHETERIZATION      HERNIA REPAIR  2018    Hernia Repair    OTHER SURGICAL HISTORY  2018    Cath Atherectomy 1 Mid-Left Anterior Descending Artery    TYMPANOSTOMY TUBE PLACEMENT  2018    Ear Pressure Equalization Tube, Insertion, Bilaterally       Patient Sexual activity questions deferred to the physician.    Family History   Problem Relation Name Age of Onset    Scleroderma Mother      Crohn's disease Brother         Allergies   Allergen Reactions    Levofloxacin Itching       Prior to Admission medications    Medication Sig Start Date End Date Taking? Authorizing Provider   aspirin 81 mg EC tablet Take 1 tablet (81 mg) by mouth once daily.   Yes Historical Provider, MD   atorvastatin (Lipitor) 80 mg tablet Take 1 tablet (80 mg) by mouth once daily. 24 Yes Kb Manley PA-C   cholecalciferol (Vitamin D-3) 50 MCG (2000 UT) tablet Take 1 tablet (2,000 Units) by mouth once daily. 10/2/18  Yes Historical Provider, MD   glimepiride (AmaryL) 2 mg tablet Take 1 tablet (2 mg) by mouth 2 times a day. 12/5/24 3/5/25 Yes NIKUNJ Lemus-DAVID   lisinopril 10 mg tablet Take 1 tablet (10 mg) by mouth once daily. 24 Yes Kb Manley PA-C   metFORMIN (Glucophage) 1,000 mg tablet Take 1 tablet (1,000 mg) by mouth 2 times daily  "(morning and late afternoon). 12/5/24 1/9/26 Yes LUCA Lemus   metoprolol succinate XL (Toprol-XL) 200 mg 24 hr tablet Take 1 tablet (200 mg) by mouth once daily. 11/7/24 11/7/25 Yes Kb Manley PA-C   omeprazole OTC (PriLOSEC OTC) 20 mg EC tablet Take 1 tablet (20 mg) by mouth once daily.   Yes Historical Provider, MD   ergocalciferol (Vitamin D-2) 1.25 MG (88146 UT) capsule Take 1 capsule (50,000 Units) by mouth 1 (one) time per week.  Patient not taking: Reported on 1/24/2025 12/5/24 9/1/25  LUCA Lemus   nitroglycerin (Nitrostat) 0.4 mg SL tablet Place 1 tablet (0.4 mg) under the tongue every 5 minutes if needed for chest pain. 5/7/24 5/7/25  Luis Islas MD   polyethylene glycol (GoLYTELY) 236-22.74-6.74 -5.86 gram solution Take 4,000 mL by mouth 1 time for 1 dose. Follow the instructions given to you by the office 1/17/25 1/17/25  LUCA Garber            Visit Vitals  /78   Pulse 85   Temp 36.1 °C (97 °F) (Temporal)   Resp 22   Ht 1.651 m (5' 5\")   Wt 125 kg (276 lb 6.4 oz)   SpO2 97%   BMI 46.00 kg/m²   Smoking Status Former   BSA 2.39 m²       DASI Risk Score      Flowsheet Row Pre-Admission Testing from 1/24/2025 in  [facility]   Can you take care of yourself (eat, dress, bathe, or use toilet)?  2.75 filed at 01/24/2025 0909   Can you walk indoors, such as around your house? 1.75 filed at 01/24/2025 0909   Can you walk a block or two on level ground?  2.75 filed at 01/24/2025 0909   Can you climb a flight of stairs or walk up a hill? 5.5 filed at 01/24/2025 0909   Can you run a short distance? 0 filed at 01/24/2025 0909   Can you do light work around the house like dusting or washing dishes? 2.7 filed at 01/24/2025 0909   Can you do moderate work around the house like vacuuming, sweeping floors or carrying groceries? 3.5 filed at 01/24/2025 0909   Can you do heavy work around the house like scrubbing floors or lifting and moving heavy " furniture?  8 filed at 01/24/2025 0909   Can you do yard work like raking leaves, weeding or pushing a mower? 4.5 filed at 01/24/2025 0909   Can you participate in moderate recreational activities like golf, bowling, dancing, doubles tennis or throwing a baseball or football? 0 filed at 01/24/2025 0909   Can you participate in strenous sports like swimming, singles tennis, football, basketball, or skiing? 0 filed at 01/24/2025 0909          Caprini DVT Assessment      Flowsheet Row Pre-Admission Testing from 1/24/2025 in  [facility]   DVT Score (IF A SCORE IS NOT CALCULATING, MUST SELECT A BMI TO COMPLETE) 5 filed at 01/24/2025 0927   Medical Factors --  [hx of Acute STEMI] filed at 01/24/2025 0927   Surgical Factors Minor surgery planned filed at 01/24/2025 0927   BMI (BMI MUST BE CHOSEN) 41-50 (Morbid obesity) filed at 01/24/2025 0927          Modified Frailty Index    No data to display       CHADS2 Stroke Risk  Current as of today        N/A 3 to 100%: High Risk   2 to < 3%: Medium Risk   0 to < 2%: Low Risk     Last Change: N/A          This score determines the patient's risk of having a stroke if the patient has atrial fibrillation.        This score is not applicable to this patient. Components are not calculated.          Revised Cardiac Risk Index      Flowsheet Row Pre-Admission Testing from 1/24/2025 in  [facility]   High-Risk Surgery (Intraperitoneal, Intrathoracic,Suprainguinal vascular) 0 filed at 01/24/2025 0928   History of ischemic heart disease (History of MI, History of positive exercuse test, Current chest paint considered due to myocardial ischemia, Use of nitrate therapy, ECG with pathological Q Waves) 1 filed at 01/24/2025 0928   History of congestive heart failure (pulmonary edemia, bilateral rales or S3 gallop, Paroxysmal nocturnal dyspnea, CXR showing pulmonary vascular redistribution) 0 filed at 01/24/2025 0928   History of cerebrovascular disease (Prior TIA  or stroke) 0 filed at 01/24/2025 0928   Pre-operative insulin treatment 0 filed at 01/24/2025 0928   Pre-operative creatinine>2 mg/dl 0 filed at 01/24/2025 0928   Revised Cardiac Risk Calculator 1 filed at 01/24/2025 0928          Apfel Simplified Score      Flowsheet Row Pre-Admission Testing from 1/24/2025 in  [facility]   Smoking status 1 filed at 01/24/2025 0929   History of motion sickness or PONV  0 filed at 01/24/2025 0929   Use of postoperative opioids 0 filed at 01/24/2025 0929   Gender - Female 0=No filed at 01/24/2025 0929   Apfel Simplified Score Calculator 1 filed at 01/24/2025 0929          Risk Analysis Index Results This Encounter    No data found in the last 10 encounters.       Stop Bang Score      Flowsheet Row Pre-Admission Testing from 1/24/2025 in  [facility]   Do you snore loudly? 1 filed at 01/24/2025 0908   Do you often feel tired or fatigued after your sleep? 1 filed at 01/24/2025 0908   Has anyone ever observed you stop breathing in your sleep? 1 filed at 01/24/2025 0908   Do you have or are you being treated for high blood pressure? 1 filed at 01/24/2025 0908   Recent BMI (Calculated) 46 filed at 01/24/2025 0908   Is BMI greater than 35 kg/m2? 1=Yes filed at 01/24/2025 0908   Age older than 50 years old? 1=Yes filed at 01/24/2025 0908   Is your neck circumference greater than 17 inches (Male) or 16 inches (Female)? 1 filed at 01/24/2025 0908   Gender - Male 1=Yes filed at 01/24/2025 0908   STOP-BANG Total Score 8 filed at 01/24/2025 0908          Prodigy: High Risk  Total Score: 8              Prodigy Gender Score          ARISCAT Score for Postoperative Pulmonary Complications    No data to display       Arredondo Perioperative Risk for Myocardial Infarction or Cardiac Arrest (MASTER)    No data to display           Results for orders placed or performed in visit on 01/24/25 (from the past 24 hours)   CBC   Result Value Ref Range    WBC 9.5 4.4 - 11.3 x10*3/uL     nRBC 0.0 0.0 - 0.0 /100 WBCs    RBC 4.99 4.50 - 5.90 x10*6/uL    Hemoglobin 15.5 13.5 - 17.5 g/dL    Hematocrit 44.7 41.0 - 52.0 %    MCV 90 80 - 100 fL    MCH 31.1 26.0 - 34.0 pg    MCHC 34.7 32.0 - 36.0 g/dL    RDW 11.9 11.5 - 14.5 %    Platelets 265 150 - 450 x10*3/uL   Basic Metabolic Panel   Result Value Ref Range    Glucose 310 (H) 74 - 99 mg/dL    Sodium 133 (L) 136 - 145 mmol/L    Potassium 4.1 3.5 - 5.3 mmol/L    Chloride 100 98 - 107 mmol/L    Bicarbonate 23 21 - 32 mmol/L    Anion Gap 14 10 - 20 mmol/L    Urea Nitrogen 13 6 - 23 mg/dL    Creatinine 0.71 0.50 - 1.30 mg/dL    eGFR >90 >60 mL/min/1.73m*2    Calcium 8.9 8.6 - 10.3 mg/dL      Assessment and Plan:     Gastrointestinal:  Presents for colon ca screening. Scheduled for colonoscopy under MAC anesthesia per Dr. Yates on 2/7/25.   CBC, BMP ordered. Blood sugar elevated @ 310. Na 133.   EKG from 5/ 2024 shows SR with anterior infarct. He has a history of STEMI in 2018.   No assessment needed today, H&P is dated 1/17/25 per DANIEL CALVIN. Reviewed note.   Patient is no longer taking Ozempic. Elevated BS today.  Last A1c from 11/2023 is elevated @ 10.9. patient has a new A1c order in the computer from his PCP, he's planning on getting this drawn later this week. Continue to follow up with PCP for DM management.   Continue ASA.   All surgery instructions reviewed with patient by RN. Verbalized understanding.

## 2025-01-24 NOTE — PREPROCEDURE INSTRUCTIONS
Medication List            Accurate as of January 24, 2025  9:19 AM. Always use your most recent med list.                aspirin 81 mg EC tablet     atorvastatin 80 mg tablet  Commonly known as: Lipitor  Take 1 tablet (80 mg) by mouth once daily.     cholecalciferol 50 MCG (2000 UT) tablet  Commonly known as: Vitamin D-3     ergocalciferol 1.25 MG (46139 UT) capsule  Commonly known as: Vitamin D-2  Take 1 capsule (50,000 Units) by mouth 1 (one) time per week.     glimepiride 2 mg tablet  Commonly known as: AmaryL  Take 1 tablet (2 mg) by mouth 2 times a day.     lisinopril 10 mg tablet  Take 1 tablet (10 mg) by mouth once daily.     metFORMIN 1,000 mg tablet  Commonly known as: Glucophage  Take 1 tablet (1,000 mg) by mouth 2 times daily (morning and late afternoon).     metoprolol succinate  mg 24 hr tablet  Commonly known as: Toprol-XL  Take 1 tablet (200 mg) by mouth once daily.     nitroglycerin 0.4 mg SL tablet  Commonly known as: Nitrostat  Place 1 tablet (0.4 mg) under the tongue every 5 minutes if needed for chest pain.     omeprazole OTC 20 mg EC tablet  Commonly known as: PriLOSEC OTC                              NPO Instructions:    Do not eat any food after midnight the night before your surgery/procedure.    Additional Instructions:     Wear  comfortable loose fitting clothing  Do not use moisturizers, creams, lotions or perfume  All jewelry and valuables should be left at home    Must have a    You may take all your medications up until the day before the procedure  Please take metoprolol in the morning of the procedure with a sip of water  Follow colon prep instructions

## 2025-02-05 ENCOUNTER — ANESTHESIA EVENT (OUTPATIENT)
Dept: OPERATING ROOM | Facility: HOSPITAL | Age: 55
End: 2025-02-05
Payer: COMMERCIAL

## 2025-02-07 ENCOUNTER — ANESTHESIA (OUTPATIENT)
Dept: OPERATING ROOM | Facility: HOSPITAL | Age: 55
End: 2025-02-07
Payer: COMMERCIAL

## 2025-02-07 ENCOUNTER — HOSPITAL ENCOUNTER (OUTPATIENT)
Dept: OPERATING ROOM | Facility: HOSPITAL | Age: 55
Discharge: HOME | End: 2025-02-07
Payer: COMMERCIAL

## 2025-02-07 ENCOUNTER — HOSPITAL ENCOUNTER (OUTPATIENT)
Dept: CARDIOLOGY | Facility: HOSPITAL | Age: 55
Discharge: HOME | End: 2025-02-07
Payer: COMMERCIAL

## 2025-02-07 VITALS
HEIGHT: 65 IN | SYSTOLIC BLOOD PRESSURE: 121 MMHG | TEMPERATURE: 97.5 F | BODY MASS INDEX: 44.98 KG/M2 | HEART RATE: 72 BPM | RESPIRATION RATE: 15 BRPM | WEIGHT: 270 LBS | OXYGEN SATURATION: 99 % | DIASTOLIC BLOOD PRESSURE: 65 MMHG

## 2025-02-07 DIAGNOSIS — Z12.11 COLON CANCER SCREENING: ICD-10-CM

## 2025-02-07 PROBLEM — Z79.01 ANTICOAGULANT LONG-TERM USE: Status: ACTIVE | Noted: 2025-02-07

## 2025-02-07 LAB — GLUCOSE BLD MANUAL STRIP-MCNC: 262 MG/DL (ref 74–99)

## 2025-02-07 PROCEDURE — 2500000004 HC RX 250 GENERAL PHARMACY W/ HCPCS (ALT 636 FOR OP/ED): Performed by: ANESTHESIOLOGY

## 2025-02-07 PROCEDURE — 3600000002 HC OR TIME - INITIAL BASE CHARGE - PROCEDURE LEVEL TWO: Performed by: INTERNAL MEDICINE

## 2025-02-07 PROCEDURE — 7100000009 HC PHASE TWO TIME - INITIAL BASE CHARGE: Performed by: INTERNAL MEDICINE

## 2025-02-07 PROCEDURE — 7100000010 HC PHASE TWO TIME - EACH INCREMENTAL 1 MINUTE: Performed by: INTERNAL MEDICINE

## 2025-02-07 PROCEDURE — 3700000002 HC GENERAL ANESTHESIA TIME - EACH INCREMENTAL 1 MINUTE: Performed by: INTERNAL MEDICINE

## 2025-02-07 PROCEDURE — 2500000004 HC RX 250 GENERAL PHARMACY W/ HCPCS (ALT 636 FOR OP/ED)

## 2025-02-07 PROCEDURE — 45385 COLONOSCOPY W/LESION REMOVAL: CPT | Performed by: INTERNAL MEDICINE

## 2025-02-07 PROCEDURE — 2500000005 HC RX 250 GENERAL PHARMACY W/O HCPCS: Performed by: ANESTHESIOLOGY

## 2025-02-07 PROCEDURE — 93005 ELECTROCARDIOGRAM TRACING: CPT

## 2025-02-07 PROCEDURE — 3600000007 HC OR TIME - EACH INCREMENTAL 1 MINUTE - PROCEDURE LEVEL TWO: Performed by: INTERNAL MEDICINE

## 2025-02-07 PROCEDURE — 2500000001 HC RX 250 WO HCPCS SELF ADMINISTERED DRUGS (ALT 637 FOR MEDICARE OP): Performed by: ANESTHESIOLOGY

## 2025-02-07 PROCEDURE — 3700000001 HC GENERAL ANESTHESIA TIME - INITIAL BASE CHARGE: Performed by: INTERNAL MEDICINE

## 2025-02-07 PROCEDURE — 82947 ASSAY GLUCOSE BLOOD QUANT: CPT

## 2025-02-07 RX ORDER — FAMOTIDINE 10 MG/ML
20 INJECTION INTRAVENOUS ONCE
Status: COMPLETED | OUTPATIENT
Start: 2025-02-07 | End: 2025-02-07

## 2025-02-07 RX ORDER — SODIUM CHLORIDE 9 MG/ML
20 INJECTION, SOLUTION INTRAVENOUS CONTINUOUS
Status: DISCONTINUED | OUTPATIENT
Start: 2025-02-07 | End: 2025-02-08 | Stop reason: HOSPADM

## 2025-02-07 RX ORDER — METOCLOPRAMIDE HYDROCHLORIDE 5 MG/ML
10 INJECTION INTRAMUSCULAR; INTRAVENOUS ONCE
Status: COMPLETED | OUTPATIENT
Start: 2025-02-07 | End: 2025-02-07

## 2025-02-07 RX ORDER — ALBUTEROL SULFATE 0.83 MG/ML
2.5 SOLUTION RESPIRATORY (INHALATION) ONCE
Status: DISCONTINUED | OUTPATIENT
Start: 2025-02-07 | End: 2025-02-07 | Stop reason: HOSPADM

## 2025-02-07 RX ORDER — PROPOFOL 10 MG/ML
INJECTION, EMULSION INTRAVENOUS AS NEEDED
Status: DISCONTINUED | OUTPATIENT
Start: 2025-02-07 | End: 2025-02-07

## 2025-02-07 RX ORDER — SODIUM CITRATE AND CITRIC ACID MONOHYDRATE 334; 500 MG/5ML; MG/5ML
30 SOLUTION ORAL ONCE
Status: COMPLETED | OUTPATIENT
Start: 2025-02-07 | End: 2025-02-07

## 2025-02-07 RX ADMIN — METOCLOPRAMIDE 10 MG: 5 INJECTION, SOLUTION INTRAMUSCULAR; INTRAVENOUS at 07:22

## 2025-02-07 RX ADMIN — PROPOFOL 200 MG: 10 INJECTION, EMULSION INTRAVENOUS at 08:10

## 2025-02-07 RX ADMIN — FAMOTIDINE 20 MG: 10 INJECTION, SOLUTION INTRAVENOUS at 07:22

## 2025-02-07 RX ADMIN — Medication 6 L/MIN: at 07:41

## 2025-02-07 RX ADMIN — NITROGLYCERIN 0.5 INCH: 20 OINTMENT TOPICAL at 07:20

## 2025-02-07 RX ADMIN — SODIUM CITRATE AND CITRIC ACID MONOHYDRATE 30 ML: 500; 334 SOLUTION ORAL at 07:20

## 2025-02-07 SDOH — HEALTH STABILITY: MENTAL HEALTH: CURRENT SMOKER: 0

## 2025-02-07 ASSESSMENT — PAIN - FUNCTIONAL ASSESSMENT: PAIN_FUNCTIONAL_ASSESSMENT: 0-10

## 2025-02-07 ASSESSMENT — PAIN SCALES - GENERAL
PAINLEVEL_OUTOF10: 0 - NO PAIN
PAIN_LEVEL: 0
PAINLEVEL_OUTOF10: 0 - NO PAIN

## 2025-02-07 NOTE — ANESTHESIA PREPROCEDURE EVALUATION
Patient: Juancarlos Valle    Procedure Information       Date/Time: 02/07/25 0800    Scheduled providers: Ruben Yates MD    Procedure: COLONOSCOPY    Location: [facility] OR            Relevant Problems   Cardiac   (+) CAD (coronary artery disease)   (+) High triglycerides   (+) Hypertension, essential, benign   (+) Mixed hyperlipidemia   (+) ST elevation myocardial infarction involving left anterior descending (LAD) coronary artery (Multi)      Pulmonary   (+) RUSTY (obstructive sleep apnea)      Endocrine   (+) Class 3 severe obesity due to excess calories with serious comorbidity and body mass index (BMI) of 40.0 to 44.9 in adult   (+) Type 2 diabetes mellitus      Hematology   (+) Anticoagulant long-term use       Clinical information reviewed:   Tobacco  Allergies  Meds   Med Hx  Surg Hx   Fam Hx  Soc Hx        NPO Detail:  NPO/Void Status  Date of Last Liquid: 02/07/25  Time of Last Liquid: 0530 (12 oz)  Date of Last Solid: 02/05/25  Time of Last Solid: 0300  Last Intake Type: Clear fluids         Physical Exam    Airway  Mallampati: II  TM distance: >3 FB  Neck ROM: full     Cardiovascular   Rhythm: regular  Rate: normal     Dental - normal exam     Pulmonary - normal exam     Abdominal            Anesthesia Plan    History of general anesthesia?: yes  History of complications of general anesthesia?: no    ASA 3     MAC     The patient is not a current smoker.  Patient was not previously instructed to abstain from smoking on day of procedure.  Patient did not smoke on day of procedure.    Anesthetic plan and risks discussed with patient.  Use of blood products discussed with patient who consented to blood products.

## 2025-02-07 NOTE — ANESTHESIA POSTPROCEDURE EVALUATION
Patient: Juancarlos Valle    Procedure Summary       Date: 02/07/25 Room / Location: [facility] OR    Anesthesia Start: 0804 Anesthesia Stop: 0823    Procedure: COLONOSCOPY Diagnosis: Colon cancer screening    Scheduled Providers: Ruben Yates MD Responsible Provider: PASCUAL Gastelum    Anesthesia Type: MAC ASA Status: 3            Anesthesia Type: MAC    Vitals Value Taken Time   /65 02/07/25 0850   Temp 36.4 °C (97.5 °F) 02/07/25 0850   Pulse 72 02/07/25 0850   Resp 0 02/07/25 0850   SpO2 100 % 02/07/25 0850   Vitals shown include unfiled device data.    Anesthesia Post Evaluation    Patient location during evaluation: PACU  Patient participation: complete - patient participated  Level of consciousness: awake and alert  Pain score: 0  Pain management: adequate  Airway patency: patent  Cardiovascular status: hemodynamically stable  Respiratory status: room air  Hydration status: acceptable  Postoperative Nausea and Vomiting: none    There were no known notable events for this encounter.

## 2025-02-07 NOTE — LETTER
[facility]STR[facility]  [address] [facility] [id number] [phone]  FAX  [phone]    February 17, 2025     Juancarlos Valle  [address]  [facility] [address]      Dear Juancarlos:    Below are the results from your recent visit:      The polyps that I removed during your recent colonoscopy were tubular adenomas on pathology.  This type of polyp is not a cancer, but it is the type of polyp that could grow into a cancer over time.  Any polyps that were removed will not grow into a cancer, but having polyps like this can increase your risk of developing other polyps or eventually a cancer.  I would recommend that you have another colonoscopy in about 5 years to look for other polyps.  Please follow-up with your primary care provider.     If you have any other questions or concerns please do not hesitate to call me at my office at [phone].     Repeat Colonoscopy Interval: 5 years    Sincerely,        Ruben Yates MD                      Resulted Orders   POCT GLUCOSE   Result Value Ref Range    POCT Glucose 262 (H) 74 - 99 mg/dL   Surgical Pathology Exam   Result Value Ref Range    Case Report       Surgical Pathology                                Case: [id number]                                  Authorizing Provider:  Ruben Yates MD      Collected:           02/07/2025 0818              Ordering Location:     [facility]  Received:            02/07/2025 0840                                     OR                                                                           Pathologist:           Larry Vo MD                                                           Specimens:   A) - COLON - TRANSVERSE POLYP, TRANSVERSE POLYP                                                     B) - COLON - DESCENDING POLYP, DESCENDING POLYP                                            FINAL DIAGNOSIS       A. COLON, TRANSVERSE, POLYP:   -- Colonic  mucosa with superficial hyperplastic change (see note).    Note:  Deeper levels are obtained and examined.        B. COLON, DESCENDING, POLYP:   -- Fragments of tubular adenoma.              By the signature on this report, the individual or group listed as making the Final Interpretation/Diagnosis certifies that they have reviewed this case.       Clinical History       Z12.11 - Colon cancer screening [ICD-10-CM]  Rule out adenoma      Gross Description       A: Received in formalin, labeled with the patient's name and hospital number, are 2 fragments of tan, soft tissue aggregating to 1.0 x  0.3 x 0.1 cm. The specimen is submitted in toto in one cassette.  LOC   B: Received in formalin, labeled with the patient's name and hospital number, are multiple fragments of tan, soft tissue aggregating to 1.1 x 0.3 x 0.3 cm. The specimen is submitted in toto in one cassette.  LOC

## 2025-02-08 LAB
ATRIAL RATE: 82 BPM
P AXIS: -7 DEGREES
PR INTERVAL: 164 MS
Q ONSET: 252 MS
QRS COUNT: 13 BEATS
QRS DURATION: 82 MS
QT INTERVAL: 372 MS
QTC CALCULATION(BAZETT): 435 MS
QTC FREDERICIA: 412 MS
R AXIS: -24 DEGREES
T AXIS: 0 DEGREES
T OFFSET: 438 MS
VENTRICULAR RATE: 82 BPM

## 2025-02-14 ENCOUNTER — HOSPITAL ENCOUNTER (EMERGENCY)
Facility: HOSPITAL | Age: 55
Discharge: STILL ADMITTED OR ADMINISTRATIVE | End: 2025-02-14
Payer: COMMERCIAL

## 2025-02-14 LAB
LABORATORY COMMENT REPORT: NORMAL
PATH REPORT.FINAL DX SPEC: NORMAL
PATH REPORT.GROSS SPEC: NORMAL
PATH REPORT.RELEVANT HX SPEC: NORMAL
PATH REPORT.TOTAL CANCER: NORMAL

## 2025-02-14 PROCEDURE — 4500999001 HC ED NO CHARGE

## 2025-02-17 ENCOUNTER — TELEPHONE (OUTPATIENT)
Facility: CLINIC | Age: 55
End: 2025-02-17
Payer: COMMERCIAL

## 2025-02-17 NOTE — TELEPHONE ENCOUNTER
[facility] 2[facility]25  2:04 PM EST -----    The polyps that I removed during your recent colonoscopy were tubular adenomas on pathology.  This type of polyp is not a cancer, but it is the type of polyp that could grow into a cancer over time.  Any polyps that were removed will not grow into a cancer, but having polyps like this can increase your risk of developing other polyps or eventually a cancer.  I would recommend that you have another colonoscopy in about 5 years to look for other polyps.  Please follow-up with your primary care provider.    If you have any other questions or concerns please do not hesitate to call me at my office at [phone].    Repeat Colonoscopy Interval: 5 years

## 2025-02-26 DIAGNOSIS — E11.69 TYPE 2 DIABETES MELLITUS WITH OTHER SPECIFIED COMPLICATION, UNSPECIFIED WHETHER LONG TERM INSULIN USE (MULTI): ICD-10-CM

## 2025-02-26 RX ORDER — GLIMEPIRIDE 2 MG/1
2 TABLET ORAL 2 TIMES DAILY
Qty: 180 TABLET | Refills: 0 | Status: SHIPPED | OUTPATIENT
Start: 2025-02-26

## 2025-03-06 ENCOUNTER — APPOINTMENT (OUTPATIENT)
Dept: PRIMARY CARE | Facility: CLINIC | Age: 55
End: 2025-03-06
Payer: COMMERCIAL

## 2025-04-02 ENCOUNTER — APPOINTMENT (OUTPATIENT)
Dept: PRIMARY CARE | Facility: CLINIC | Age: 55
End: 2025-04-02
Payer: COMMERCIAL

## 2025-05-06 ENCOUNTER — APPOINTMENT (OUTPATIENT)
Dept: PRIMARY CARE | Facility: CLINIC | Age: 55
End: 2025-05-06
Payer: COMMERCIAL

## 2025-05-06 VITALS
OXYGEN SATURATION: 92 % | HEART RATE: 81 BPM | SYSTOLIC BLOOD PRESSURE: 128 MMHG | WEIGHT: 276 LBS | HEIGHT: 65 IN | BODY MASS INDEX: 45.98 KG/M2 | DIASTOLIC BLOOD PRESSURE: 82 MMHG

## 2025-05-06 DIAGNOSIS — Z11.59 NEED FOR HEPATITIS C SCREENING TEST: ICD-10-CM

## 2025-05-06 DIAGNOSIS — E11.69 TYPE 2 DIABETES MELLITUS WITH OTHER SPECIFIED COMPLICATION, UNSPECIFIED WHETHER LONG TERM INSULIN USE (MULTI): ICD-10-CM

## 2025-05-06 DIAGNOSIS — E55.9 VITAMIN D DEFICIENCY: ICD-10-CM

## 2025-05-06 DIAGNOSIS — E78.1 HIGH TRIGLYCERIDES: ICD-10-CM

## 2025-05-06 DIAGNOSIS — Z11.4 ENCOUNTER FOR SCREENING FOR HIV: Primary | ICD-10-CM

## 2025-05-06 PROBLEM — E11.65 TYPE 2 DIABETES MELLITUS WITH HYPERGLYCEMIA (MULTI): Status: ACTIVE | Noted: 2025-05-06

## 2025-05-06 PROBLEM — Z86.39 HISTORY OF OBESITY: Status: RESOLVED | Noted: 2025-05-06 | Resolved: 2025-05-06

## 2025-05-06 PROBLEM — I25.2 HISTORY OF ST ELEVATION MYOCARDIAL INFARCTION (STEMI): Status: RESOLVED | Noted: 2023-10-18 | Resolved: 2025-05-06

## 2025-05-06 PROBLEM — Z95.5 H/O HEART ARTERY STENT: Status: RESOLVED | Noted: 2023-10-18 | Resolved: 2025-05-06

## 2025-05-06 LAB — POC HEMOGLOBIN A1C: 10.5 % (ref 4.2–6.5)

## 2025-05-06 PROCEDURE — 83036 HEMOGLOBIN GLYCOSYLATED A1C: CPT | Performed by: NURSE PRACTITIONER

## 2025-05-06 PROCEDURE — 3046F HEMOGLOBIN A1C LEVEL >9.0%: CPT | Performed by: NURSE PRACTITIONER

## 2025-05-06 PROCEDURE — 1036F TOBACCO NON-USER: CPT | Performed by: NURSE PRACTITIONER

## 2025-05-06 PROCEDURE — 3008F BODY MASS INDEX DOCD: CPT | Performed by: NURSE PRACTITIONER

## 2025-05-06 PROCEDURE — 4010F ACE/ARB THERAPY RXD/TAKEN: CPT | Performed by: NURSE PRACTITIONER

## 2025-05-06 PROCEDURE — 99213 OFFICE O/P EST LOW 20 MIN: CPT | Performed by: NURSE PRACTITIONER

## 2025-05-06 PROCEDURE — 3079F DIAST BP 80-89 MM HG: CPT | Performed by: NURSE PRACTITIONER

## 2025-05-06 PROCEDURE — 3074F SYST BP LT 130 MM HG: CPT | Performed by: NURSE PRACTITIONER

## 2025-05-06 RX ORDER — GLIMEPIRIDE 2 MG/1
2 TABLET ORAL 2 TIMES DAILY
Qty: 180 TABLET | Refills: 0 | Status: CANCELLED | OUTPATIENT
Start: 2025-05-06

## 2025-05-06 RX ORDER — GLIMEPIRIDE 4 MG/1
4 TABLET ORAL 2 TIMES DAILY
Qty: 200 TABLET | Refills: 3 | Status: SHIPPED | OUTPATIENT
Start: 2025-05-06 | End: 2026-06-10

## 2025-05-06 RX ORDER — METFORMIN HYDROCHLORIDE 1000 MG/1
1000 TABLET ORAL
Qty: 200 TABLET | Refills: 3 | Status: SHIPPED | OUTPATIENT
Start: 2025-05-06 | End: 2026-06-10

## 2025-05-06 RX ORDER — PIOGLITAZONE 30 MG/1
30 TABLET ORAL DAILY
Qty: 90 TABLET | Refills: 0 | Status: SHIPPED | OUTPATIENT
Start: 2025-05-06 | End: 2025-08-04

## 2025-05-06 RX ORDER — ERGOCALCIFEROL 1.25 MG/1
1.25 CAPSULE ORAL WEEKLY
Qty: 12 CAPSULE | Refills: 2 | Status: SHIPPED | OUTPATIENT
Start: 2025-05-06 | End: 2026-01-31

## 2025-05-06 ASSESSMENT — PATIENT HEALTH QUESTIONNAIRE - PHQ9
2. FEELING DOWN, DEPRESSED OR HOPELESS: NOT AT ALL
1. LITTLE INTEREST OR PLEASURE IN DOING THINGS: NOT AT ALL
SUM OF ALL RESPONSES TO PHQ9 QUESTIONS 1 AND 2: 0

## 2025-05-06 ASSESSMENT — ENCOUNTER SYMPTOMS
LOSS OF SENSATION IN FEET: 0
OCCASIONAL FEELINGS OF UNSTEADINESS: 0
DEPRESSION: 0

## 2025-05-06 ASSESSMENT — COLUMBIA-SUICIDE SEVERITY RATING SCALE - C-SSRS
2. HAVE YOU ACTUALLY HAD ANY THOUGHTS OF KILLING YOURSELF?: NO
1. IN THE PAST MONTH, HAVE YOU WISHED YOU WERE DEAD OR WISHED YOU COULD GO TO SLEEP AND NOT WAKE UP?: NO
6. HAVE YOU EVER DONE ANYTHING, STARTED TO DO ANYTHING, OR PREPARED TO DO ANYTHING TO END YOUR LIFE?: NO

## 2025-05-06 NOTE — PATIENT INSTRUCTIONS
Your hemoglobin A1c is elevated at 10.5%, but down from 13.7%.  I have increased your glimepiride to 4 mg twice daily and started you on Actos 30 mg daily.  Take your medications with food.  I have referred to consult with in-house pharmacist.  I have ordered labs for you to complete a few days prior to 3 months follow-up.

## 2025-05-06 NOTE — PROGRESS NOTES
"Subjective   Patient ID: Juancarlos Valle is a 54 y.o. male who presents for Follow-up (Patient here for a follow-up/Different prescrption for ozempic).    Patient is following up for management of type 2 diabetes mellitus and vitamin D deficiency.  During his last office visit, he was prescribed Ozempic for better blood sugar control and weight loss.  However, it was not covered by his insurance, so he was never dispensed.  He is in office point-of-care hemoglobin A1c today is elevated at 10.5%, but down from 13.7% about 5 months earlier.  Denies acute medical complaint.         Review of Systems   All other systems reviewed and are negative.      Objective   /82 (BP Location: Left arm, Patient Position: Sitting, BP Cuff Size: Adult)   Pulse 81   Ht 1.651 m (5' 5\")   Wt 125 kg (276 lb)   SpO2 92%   BMI 45.93 kg/m²     Physical Exam  Vitals reviewed.   Constitutional:       Appearance: He is obese.   HENT:      Head: Normocephalic and atraumatic.      Right Ear: External ear normal.      Left Ear: External ear normal.      Nose: Nose normal.      Mouth/Throat:      Mouth: Mucous membranes are moist.   Cardiovascular:      Rate and Rhythm: Normal rate.   Pulmonary:      Effort: Pulmonary effort is normal.   Musculoskeletal:      Cervical back: Neck supple.   Skin:     General: Skin is warm and dry.   Neurological:      General: No focal deficit present.      Mental Status: He is alert and oriented to person, place, and time.   Psychiatric:         Mood and Affect: Mood normal.         Behavior: Behavior normal.         Thought Content: Thought content normal.         Judgment: Judgment normal.         Assessment/Plan   Problem List Items Addressed This Visit       High triglycerides    Relevant Orders    Lipid panel    Type 2 diabetes mellitus    Relevant Medications    metFORMIN (Glucophage) 1,000 mg tablet    glimepiride (AmaryL) 4 mg tablet    pioglitazone (Actos) 30 mg tablet    Other Relevant Orders    " Referral to Clinical Pharmacy    POCT glycosylated hemoglobin (Hb A1C) manually resulted (Completed)    Hemoglobin A1C    Comprehensive Metabolic Panel    Follow Up In [facility] - PCP - Established    Vitamin D deficiency    Relevant Medications    ergocalciferol (Vitamin D-2) 1250 mcg (50,000 units) capsule    Other Relevant Orders    Vitamin D 25-Hydroxy,Total (for eval of Vitamin D levels)     Other Visit Diagnoses         Encounter for screening for HIV    -  Primary    Relevant Orders    HIV 1/2 Antigen/Antibody Screen with Reflex to Confirmation      Need for hepatitis C screening test        Relevant Orders    Hepatitis C antibody

## 2025-05-06 NOTE — ASSESSMENT & PLAN NOTE
Continue vitamin D supplement 50,000 unit weekly.  Recheck serum vitamin D level with next blood draw.

## 2025-05-06 NOTE — ASSESSMENT & PLAN NOTE
In office point-of-care hemoglobin A1c today is elevated at 10.5%.  Continue metformin 1000 mg twice daily, increase glimepiride to 4 mg twice daily with food and start Actos 30 mg daily.  Patient referred to consult with in-house pharmacist.  Annual diabetic eye exam advised.

## 2025-05-09 ENCOUNTER — TELEPHONE (OUTPATIENT)
Dept: CARDIOLOGY | Facility: HOSPITAL | Age: 55
End: 2025-05-09
Payer: COMMERCIAL

## 2025-05-09 NOTE — TELEPHONE ENCOUNTER
LVM for pt - need to r/s his 6mo follow up with Dr. Islas from 5/13 (accommodating provider schedule d/t emergency). Moved appt to Friday 5/30 1:15pm and requested pt call office to confirm this change.

## 2025-05-13 ENCOUNTER — APPOINTMENT (OUTPATIENT)
Dept: CARDIOLOGY | Facility: HOSPITAL | Age: 55
End: 2025-05-13
Payer: COMMERCIAL

## 2025-05-27 ENCOUNTER — APPOINTMENT (OUTPATIENT)
Dept: PHARMACY | Facility: HOSPITAL | Age: 55
End: 2025-05-27
Payer: COMMERCIAL

## 2025-05-27 DIAGNOSIS — E11.69 TYPE 2 DIABETES MELLITUS WITH OTHER SPECIFIED COMPLICATION, UNSPECIFIED WHETHER LONG TERM INSULIN USE (MULTI): ICD-10-CM

## 2025-05-27 NOTE — PROGRESS NOTES
Clinical Pharmacy Appointment    Patient ID: Juancarlos Valle is a 54 y.o. male who presents for Diabetes    Pt is here for First appointment.     Referring Provider: Eliseo Cleaning APRN-*  PCP: NIKUNJ Lemus-CNP, DNP  Last visit with PCP: 5/6/2025   Next visit with PCP: 8/11/2025    Subjective   Medication Reconciliation reviewed and completed    Drug Interactions  No relevant drug interactions were noted.    Medication System Management  Patient's preferred pharmacy:     [facility]/pharmacy #[id number] - [place], [place] - [address] [place] [address] AT [address] OF [address] [address]  Phone: [phone] Fax: [phone]    Adherence/Organization: taking meds as prescribed  Affordability/Accessibility: Ozempic high copay        [facility]  DIABETES ASSESSMENT   Diabetes  He presents for his initial diabetic visit. He has type 2 diabetes mellitus.       PRIMARY/SECONDARY PREVENTION:   - Statin? Yes  - ACE-I/ARB? Yes  - Aspirin? Yes    Last Recorded Vitals:  BP Readings from Last 6 Encounters:   05/06/25 128/82   02/07/25 121/65   01/24/25 136/78   01/17/25 113/76   12/05/24 110/70   11/07/24 130/84       Wt Readings from Last 6 Encounters:   05/06/25 125 kg (276 lb)   02/07/25 122 kg (270 lb)   01/24/25 125 kg (276 lb 6.4 oz)   01/17/25 126 kg (276 lb 12.8 oz)   12/05/24 120 kg (265 lb)   11/07/24 119 kg (262 lb)       CURRENT PHARMACOTHERAPY:    Glimepiride 4 mg BID  Metformin 1000 mg BID  Pioglitazone 30 mg daily    Historical therapy  Ozempic - insurance issue    Adverse Effects: none reported    Current diet: not asked  Current exercise:     Current monitoring regimen:   Patient is using: not asked    Any episodes of hypoglycemia? no    Lab Results   Component Value Date    HGBA1C 10.5 (A) 05/06/2025       Patients diabetes is poorly controlled with most recent A1c of 10.5% (Goal < 7%).         Objective   Allergies[1]  Social History     Social History Narrative    Not on file      Medication Review  Current  Outpatient Medications   Medication Instructions    aspirin 81 mg EC tablet 1 tablet, Daily    atorvastatin (LIPITOR) 80 mg, oral, Daily    ergocalciferol (VITAMIN D-2) 1.25 mg, oral, Weekly    glimepiride (AMARYL) 4 mg, oral, 2 times daily    lisinopril 10 mg, oral, Daily    metFORMIN (GLUCOPHAGE) 1,000 mg, oral, 2 times daily (morning and late afternoon)    metoprolol succinate XL (TOPROL-XL) 200 mg, oral, Daily    nitroglycerin (NITROSTAT) 0.4 mg, sublingual, Every 5 min PRN    pioglitazone (ACTOS) 30 mg, oral, Daily      Vitals  BP Readings from Last 2 Encounters:   05/06/25 128/82   02/07/25 121/65     BMI Readings from Last 1 Encounters:   05/06/25 45.93 kg/m²      Labs  A1C  Lab Results   Component Value Date    HGBA1C 10.5 (A) 05/06/2025    HGBA1C 13.7 (A) 12/05/2024    HGBA1C 10.9 (H) 11/01/2023     BMP  Lab Results   Component Value Date    CALCIUM 8.9 01/24/2025     (L) 01/24/2025    K 4.1 01/24/2025    CO2 23 01/24/2025     01/24/2025    BUN 13 01/24/2025    CREATININE 0.71 01/24/2025    EGFR >90 01/24/2025     LFTs  Lab Results   Component Value Date    ALT 33 11/01/2023    AST 18 11/01/2023    ALKPHOS 124 (H) 11/01/2023    BILITOT 1.4 (H) 11/01/2023     FLP  Lab Results   Component Value Date    TRIG 233 (H) 11/01/2023    CHOL 108 11/01/2023    LDLF 65 02/21/2022    LDLCALC 25 11/01/2023    HDL 36.8 11/01/2023     Urine Microalbumin  Lab Results   Component Value Date    MICROALBCREA  12/05/2024      Comment:      One or more analytes used in this calculation is outside of the analytical measurement range. Calculation cannot be performed.     Weight Management  Wt Readings from Last 3 Encounters:   05/06/25 125 kg (276 lb)   02/07/25 122 kg (270 lb)   01/24/25 125 kg (276 lb 6.4 oz)      There is no height or weight on file to calculate BMI.     PATIENT EDUCATION/DISCUSSION:  - Counseled patient on MOA, expectations, side effects, duration of therapy, contraindications, administration, and  monitoring parameters  - Answered all patient questions and concerns  - Discussed Ozempic, patient noted recently changed insurance and Ozempic no longer covered. [facility] attempted Ozempic/other GLP-1a test claim, unable to run test claim due to incorrect information on insurance card. Advised pt to call insurance company to update info      Assessment/Plan   Problem List Items Addressed This Visit       Type 2 diabetes mellitus    Patient's goal A1c is < 7%.  Is pt at goal? No, 10.5    CONTINUE  Glimepiride 4 mg BID  Metformin 1000 mg BID  Pioglitazone 30 mg daily            Clinical Pharmacist follow-up: pending patient call back, Telehealth visit  Patient is not followed in CCM.     Continue all meds under the continuation of care with the referring provider and clinical pharmacy team.    Batsheva Phipps PharmD   Meds Clinical Pharmacist  Phone: [phone]     Verbal consent to manage patient's drug therapy was obtained from the patient. They were informed they may decline to participate or withdraw from participation in pharmacy services at any time.         [1]   Allergies  Allergen Reactions    Levofloxacin Itching

## 2025-05-27 NOTE — ASSESSMENT & PLAN NOTE
Patient's goal A1c is < 7%.  Is pt at goal? No, 10.5    CONTINUE  Glimepiride 4 mg BID  Metformin 1000 mg BID  Pioglitazone 30 mg daily

## 2025-05-29 NOTE — PROGRESS NOTES
Chief Complaint:   No chief complaint on file.     History Of Present Illness:    Juancarlos Valle is a 54 y.o. male presenting for 6-month follow up.  H/o CAD s/p anterior STEMI 5/19/18 s/p PCI with KAREN to prox-mid LAD and POBA distal LAD, Preserved LVEF 55%, moderate upper septal hypertrophy, mild eccentric AR, DL/hypertriglyceridemia, DM2 who presents for annual follow up.     Last seen by LILI Manley 11/2024. At the time, Pt had stopped taking his atorvastatin lisinopril and metoprolol due to cost of the medication - Sindhu refilled these medications and gave him a GoodRx card to see if we can reduce his cost. He had no cardiac complaints.     Today, Pt is doing well from cardiovascular perspective.  Denies chest pain/pressure, SOB, dyspnea on exertion, LE edema, orthopnea, PND, palpitations, LH/dizziness, presyncope, overt syncope.  Compliant with home cardiac medications as prescribed. He got BW done for his colonoscopy but not the orders from PCP. Former smoker, was smoking intermittently after his heart attack but has not smoked in a long time. Has not needed to take nitroglycerin. No longer taking Vascepa - not affordable. He has questions about Actos - he states this was prescribed by PCP but was not explained to him. He read that he should not take this if he has heart failure and did not want to start it. He reports difficulty getting access to Ozempic - he is currently working with clinical pharmacy.     ROS:  The remainder of the review of systems was obtained, as was negative as pertains to the chief complaint.    CV testing and labs reviewed:  Labs 1/2025:  K 4.1  BUN 13  Cr 0.71  H&H 15.5 44.7  Lipid labs 11/2023:    HDL 36.8  LDL 25      Stress test 6/2018:  Normal exercise stress test, below average exercise capacity.    TTE 5/2018  EF 55% stage I DD, moderate upper septal hypertrophy, mild aortic regurg , trace tricuspid regurg,     LHC 5/2018  PCI  mid LAD    Prior history:   The patient  presented in 5/2018 with c/o chest pain waking him from sleep,with asociated SOB, dizziness, clammy sensation, diaphoresis. [facility] cor angio demonstrated acute prox-mid LAD plaque rupture, with 100% thrombotic occlusion of the distal LAD. S/p successful PCI with aspiration thrombectomy, Xience Alpine 3.5 x 23mm KAREN to prox-mid LAD, postdilated with NC 3.5mm balloon at 20 ayla, then 3.75mm balloon at 22 ayla; as well as POBA to distal LAD with improvement in blood flow. His TTE on 5/22/18 demonstrated EF 55%, moderate upper septal hypertrophy with normal LV thickness, mild eccentric AR, trace TR.     Last Recorded Vitals:  There were no vitals filed for this visit.    Past Medical History:  He has a past medical history of Coronary artery disease, Diabetes mellitus (Multi), Diabetes mellitus type I (Multi), GERD (gastroesophageal reflux disease), H/O heart artery stent (10/18/2023), History of obesity (05/06/2025), History of ST elevation myocardial infarction (STEMI) (10/18/2023), Hyperlipidemia, Hypertension, MI (myocardial infarction) (Multi), Personal history of other diseases of the digestive system, and Personal history of other endocrine, nutritional and metabolic disease.    Past Surgical History:  He has a past surgical history that includes Tympanostomy tube placement (06/05/2018); Ankle surgery (06/05/2018); Hernia repair (06/05/2018); Other surgical history (05/25/2018); and Cardiac catheterization.      Social History:  He reports that he has quit smoking. His smoking use included cigarettes. He has never used smokeless tobacco. He reports that he does not currently use alcohol. He reports that he does not use drugs.    Family History:  Family History   Problem Relation Name Age of Onset    Scleroderma Mother      Crohn's disease Brother          Allergies:  Levofloxacin    Outpatient Medications:  Current Outpatient Medications   Medication Instructions    aspirin 81 mg EC tablet 1 tablet, Daily     atorvastatin (LIPITOR) 80 mg, oral, Daily    ergocalciferol (VITAMIN D-2) 1.25 mg, oral, Weekly    glimepiride (AMARYL) 4 mg, oral, 2 times daily    lisinopril 10 mg, oral, Daily    metFORMIN (GLUCOPHAGE) 1,000 mg, oral, 2 times daily (morning and late afternoon)    metoprolol succinate XL (TOPROL-XL) 200 mg, oral, Daily    nitroglycerin (NITROSTAT) 0.4 mg, sublingual, Every 5 min PRN    pioglitazone (ACTOS) 30 mg, oral, Daily       Physical Exam:  Physical Exam  HENT:      Head: Normocephalic.      Nose: Nose normal.   Cardiovascular:      Rate and Rhythm: Normal rate and regular rhythm.      Heart sounds: Normal heart sounds.      Comments: No carotid bruits   No leg swelling  Pulmonary:      Breath sounds: Normal breath sounds.   Abdominal:      Palpations: Abdomen is soft.   Musculoskeletal:         General: Normal range of motion.      Cervical back: Normal range of motion.   Skin:     General: Skin is warm and dry.   Neurological:      General: No focal deficit present.      Mental Status: He is alert.   Psychiatric:         Mood and Affect: Mood normal.        Last Labs:  CBC -  Lab Results   Component Value Date    WBC 9.5 01/24/2025    HGB 15.5 01/24/2025    HCT 44.7 01/24/2025    MCV 90 01/24/2025     01/24/2025       CMP -  Lab Results   Component Value Date    CALCIUM 8.9 01/24/2025    PROT 5.9 (L) 11/01/2023    ALBUMIN 4.1 11/01/2023    AST 18 11/01/2023    ALT 33 11/01/2023    ALKPHOS 124 (H) 11/01/2023    BILITOT 1.4 (H) 11/01/2023       LIPID PANEL -   Lab Results   Component Value Date    CHOL 108 11/01/2023    TRIG 233 (H) 11/01/2023    HDL 36.8 11/01/2023    CHHDL 2.9 11/01/2023    LDLF 65 02/21/2022    VLDL 47 (H) 11/01/2023    NHDL 71 11/01/2023       RENAL FUNCTION PANEL -   Lab Results   Component Value Date    GLUCOSE 310 (H) 01/24/2025     (L) 01/24/2025    K 4.1 01/24/2025     01/24/2025    CO2 23 01/24/2025    ANIONGAP 14 01/24/2025    BUN 13 01/24/2025    CREATININE  0.71 01/24/2025    GFRMALE >90 01/23/2023    CALCIUM 8.9 01/24/2025    ALBUMIN 4.1 11/01/2023        Lab Results   Component Value Date    HGBA1C 10.5 (A) 05/06/2025     Assessment/Plan   ASHD, h/o STEMI  DL - hypertriglyceridemia  DM2    Doing well symptomatically, no angina, no HF signs/symptoms.    Of note, lipids at goal on high dose atorva (stopped Zetia on his own), however with high HbA1c > 10, his TG are > 250.  We discussed the importance of DM management and diet control for lowering Tg's.    Plan:  -ASA 81mg daily lifelong  -continue Toprol XL 200mg daily   -cont lisinopril 10mg daily  -c/w high intensity statin therapy - atorva 80mg daily  -Vascepa too expensive - ok to continue fish oil 2mg bid  -advocated improvement in DM2 control, reg mod intensity exercise, and weight loss  -aggressive secondary prevention, we discussed today 150 mins of moderate intensity exercise  -SL Ntg prn - refilled today  -[facility] for [facility] and help with obtaining Ozempic  -Pt will get BW done with PCP, orders active    Scribe Attestation  By signing my name below, ILana, Scribe   attest that this documentation has been prepared under the direction and in the presence of Luis Islas MD.

## 2025-05-30 ENCOUNTER — OFFICE VISIT (OUTPATIENT)
Dept: CARDIOLOGY | Facility: HOSPITAL | Age: 55
End: 2025-05-30
Payer: COMMERCIAL

## 2025-05-30 VITALS
SYSTOLIC BLOOD PRESSURE: 118 MMHG | BODY MASS INDEX: 45.92 KG/M2 | DIASTOLIC BLOOD PRESSURE: 80 MMHG | HEIGHT: 65 IN | HEART RATE: 80 BPM | WEIGHT: 275.6 LBS

## 2025-05-30 DIAGNOSIS — I10 ESSENTIAL (PRIMARY) HYPERTENSION: ICD-10-CM

## 2025-05-30 DIAGNOSIS — I25.10 CORONARY ARTERY DISEASE INVOLVING NATIVE CORONARY ARTERY OF NATIVE HEART WITHOUT ANGINA PECTORIS: ICD-10-CM

## 2025-05-30 DIAGNOSIS — I25.10 ATHEROSCLEROTIC HEART DISEASE OF NATIVE CORONARY ARTERY WITHOUT ANGINA PECTORIS: ICD-10-CM

## 2025-05-30 DIAGNOSIS — I21.02 ST ELEVATION (STEMI) MYOCARDIAL INFARCTION INVOLVING LEFT ANTERIOR DESCENDING CORONARY ARTERY (MULTI): ICD-10-CM

## 2025-05-30 DIAGNOSIS — I25.2 HISTORY OF ST ELEVATION MYOCARDIAL INFARCTION (STEMI): ICD-10-CM

## 2025-05-30 DIAGNOSIS — E11.9 TYPE 2 DIABETES MELLITUS WITHOUT COMPLICATION, WITHOUT LONG-TERM CURRENT USE OF INSULIN: Primary | ICD-10-CM

## 2025-05-30 PROCEDURE — 3046F HEMOGLOBIN A1C LEVEL >9.0%: CPT | Performed by: INTERNAL MEDICINE

## 2025-05-30 PROCEDURE — 99214 OFFICE O/P EST MOD 30 MIN: CPT | Performed by: INTERNAL MEDICINE

## 2025-05-30 PROCEDURE — 3079F DIAST BP 80-89 MM HG: CPT | Performed by: INTERNAL MEDICINE

## 2025-05-30 PROCEDURE — 99214 OFFICE O/P EST MOD 30 MIN: CPT | Mod: 25 | Performed by: INTERNAL MEDICINE

## 2025-05-30 PROCEDURE — 3074F SYST BP LT 130 MM HG: CPT | Performed by: INTERNAL MEDICINE

## 2025-05-30 PROCEDURE — 4010F ACE/ARB THERAPY RXD/TAKEN: CPT | Performed by: INTERNAL MEDICINE

## 2025-05-30 PROCEDURE — 3008F BODY MASS INDEX DOCD: CPT | Performed by: INTERNAL MEDICINE

## 2025-05-30 RX ORDER — NITROGLYCERIN 0.4 MG/1
0.4 TABLET SUBLINGUAL EVERY 5 MIN PRN
Qty: 45 TABLET | Refills: 3 | Status: SHIPPED | OUTPATIENT
Start: 2025-05-30 | End: 2026-05-30

## 2025-05-30 NOTE — PATIENT INSTRUCTIONS
Thanks for following up in office today.    1)  Please get your blood work done that was ordered by your PCP. I am interested in your cholesterol levels. Please call when you get this done for us to review.    2)  Please continue your cardiac medications as prescribed. I refilled your nitroglycerin.    3)  I referred you to the [facility] for a consult to see if they can help you obtain medications like Ozempic.     Follow up with Timbo Manley in 6 months  If you have any questions, please call us at [phone]

## 2025-06-05 LAB
25(OH)D3+25(OH)D2 SERPL-MCNC: 44 NG/ML (ref 30–100)
ALBUMIN SERPL-MCNC: 4.2 G/DL (ref 3.6–5.1)
ALP SERPL-CCNC: 107 U/L (ref 35–144)
ALT SERPL-CCNC: 35 U/L (ref 9–46)
ANION GAP SERPL CALCULATED.4IONS-SCNC: 12 MMOL/L (CALC) (ref 7–17)
AST SERPL-CCNC: 18 U/L (ref 10–35)
BILIRUB SERPL-MCNC: 1.6 MG/DL (ref 0.2–1.2)
BUN SERPL-MCNC: 12 MG/DL (ref 7–25)
CALCIUM SERPL-MCNC: 9.2 MG/DL (ref 8.6–10.3)
CHLORIDE SERPL-SCNC: 101 MMOL/L (ref 98–110)
CHOLEST SERPL-MCNC: 126 MG/DL
CHOLEST/HDLC SERPL: 3.2 (CALC)
CO2 SERPL-SCNC: 23 MMOL/L (ref 20–32)
CREAT SERPL-MCNC: 0.7 MG/DL (ref 0.7–1.3)
EGFRCR SERPLBLD CKD-EPI 2021: 109 ML/MIN/1.73M2
EST. AVERAGE GLUCOSE BLD GHB EST-MCNC: 315 MG/DL
EST. AVERAGE GLUCOSE BLD GHB EST-SCNC: 17.4 MMOL/L
GLUCOSE SERPL-MCNC: 288 MG/DL (ref 65–99)
HBA1C MFR BLD: 12.6 %
HCV AB SERPL QL IA: NORMAL
HDLC SERPL-MCNC: 39 MG/DL
HIV 1+2 AB+HIV1 P24 AG SERPL QL IA: NORMAL
LDLC SERPL CALC-MCNC: 65 MG/DL (CALC)
NONHDLC SERPL-MCNC: 87 MG/DL (CALC)
POTASSIUM SERPL-SCNC: 4.2 MMOL/L (ref 3.5–5.3)
PROT SERPL-MCNC: 6.4 G/DL (ref 6.1–8.1)
SODIUM SERPL-SCNC: 136 MMOL/L (ref 135–146)
TRIGL SERPL-MCNC: 137 MG/DL

## 2025-06-11 DIAGNOSIS — E11.69 TYPE 2 DIABETES MELLITUS WITH OTHER SPECIFIED COMPLICATION, UNSPECIFIED WHETHER LONG TERM INSULIN USE (MULTI): Primary | ICD-10-CM

## 2025-06-12 ENCOUNTER — TELEPHONE (OUTPATIENT)
Dept: PRIMARY CARE | Facility: CLINIC | Age: 55
End: 2025-06-12
Payer: COMMERCIAL

## 2025-06-12 NOTE — TELEPHONE ENCOUNTER
----- Message from Eliseo Monaco sent at 6/11/2025 10:48 PM EDT -----  Please tell patient that his lab results shows very elevated hemoglobin A1c of 12.6%.  I have referred him to consult with our in-house clinical pharmacist for management of uncontrolled diabetes.  ----- Message -----  From: Gabriela Mitchell MA  Sent: 5/6/2025  11:48 AM EDT  To: Eliseo Monaco, NIKUNJ-CNP, DNP

## 2025-06-30 ENCOUNTER — APPOINTMENT (OUTPATIENT)
Dept: PHARMACY | Facility: HOSPITAL | Age: 55
End: 2025-06-30
Payer: COMMERCIAL

## 2025-07-15 ENCOUNTER — APPOINTMENT (OUTPATIENT)
Dept: PHARMACY | Facility: HOSPITAL | Age: 55
End: 2025-07-15
Payer: COMMERCIAL

## 2025-07-15 DIAGNOSIS — E11.69 TYPE 2 DIABETES MELLITUS WITH OTHER SPECIFIED COMPLICATION, UNSPECIFIED WHETHER LONG TERM INSULIN USE (MULTI): ICD-10-CM

## 2025-07-15 PROCEDURE — RXMED WILLOW AMBULATORY MEDICATION CHARGE

## 2025-07-15 RX ORDER — TIRZEPATIDE 2.5 MG/.5ML
2.5 INJECTION, SOLUTION SUBCUTANEOUS WEEKLY
Qty: 2 ML | Refills: 0 | Status: SHIPPED | OUTPATIENT
Start: 2025-07-15 | End: 2025-07-15 | Stop reason: SDUPTHER

## 2025-07-15 RX ORDER — TIRZEPATIDE 2.5 MG/.5ML
2.5 INJECTION, SOLUTION SUBCUTANEOUS WEEKLY
Qty: 2 ML | Refills: 0 | Status: SHIPPED | OUTPATIENT
Start: 2025-07-15

## 2025-07-15 ASSESSMENT — ENCOUNTER SYMPTOMS: POLYPHAGIA: 1

## 2025-07-15 NOTE — PROGRESS NOTES
"Pharmacy Clinic Note    Juancarlos Valle is a 54 y.o. male was referred to Clinical Pharmacy Team for diabetes management.     Referring Provider: Eliseo Cleaning APRN-*    Subjective   Allergies[1]    [id number] - [place], [place] - [address] [place] [address] AT [address]  [address] [facility] [address]  Phone: [phone] Fax: [phone]     [facility]  [address] [id number]  Phone: [phone] Fax: [phone]      Diabetes  He presents for his initial diabetic visit. He has type 2 diabetes mellitus. His disease course has been worsening. There are no hypoglycemic associated symptoms. Associated symptoms include polyphagia. There are no hypoglycemic complications. Risk factors for coronary artery disease include diabetes mellitus, male sex and obesity. Current diabetic treatments: Glimepiride 4 mg two tabs daily; Metformin 1000 mg BID. He is following a generally unhealthy diet. (Does not currently test)       Objective     There were no vitals taken for this visit.     LAB  Lab Results   Component Value Date    BILITOT 1.6 (H) 06/04/2025    CALCIUM 9.2 06/04/2025    CO2 23 06/04/2025     06/04/2025    CREATININE 0.70 06/04/2025    GLUCOSE 288 (H) 06/04/2025    ALKPHOS 107 06/04/2025    K 4.2 06/04/2025    PROT 6.4 06/04/2025     06/04/2025    AST 18 06/04/2025    ALT 35 06/04/2025    BUN 12 06/04/2025    ANIONGAP 12 06/04/2025    ALBUMIN 4.2 06/04/2025    GFRMALE >90 01/23/2023     Lab Results   Component Value Date    TRIG 137 06/04/2025    CHOL 126 06/04/2025    LDLCALC 65 06/04/2025    HDL 39 (L) 06/04/2025     Lab Results   Component Value Date    HGBA1C 12.6 (H) 06/04/2025     Estimated body mass index is 45.86 kg/m² as calculated from the following:    Height as of 5/30/25: 1.651 m (5' 5\").    Weight as of 5/30/25: 125 kg (275 lb 9.6 oz).    Current Outpatient Medications on File Prior to Visit   Medication Sig Dispense Refill    aspirin 81 mg EC tablet " Take 1 tablet (81 mg) by mouth once daily.      atorvastatin (Lipitor) 80 mg tablet Take 1 tablet (80 mg) by mouth once daily. 90 tablet 3    ergocalciferol (Vitamin D-2) 1250 mcg (50,000 units) capsule Take 1 capsule (1.25 mg) by mouth 1 (one) time per week. 12 capsule 2    glimepiride (AmaryL) 4 mg tablet Take 1 tablet (4 mg) by mouth 2 times a day. 200 tablet 3    lisinopril 10 mg tablet Take 1 tablet (10 mg) by mouth once daily. 90 tablet 3    metFORMIN (Glucophage) 1,000 mg tablet Take 1 tablet (1,000 mg) by mouth 2 times daily (morning and late afternoon). 200 tablet 3    metoprolol succinate XL (Toprol-XL) 200 mg 24 hr tablet Take 1 tablet (200 mg) by mouth once daily. 90 tablet 3    nitroglycerin (Nitrostat) 0.4 mg SL tablet Place 1 tablet (0.4 mg) under the tongue every 5 minutes if needed for chest pain. 45 tablet 3     No current facility-administered medications on file prior to visit.        HISTORICAL PHARMACOTHERAPY  - Ozempic: cost    SMBG (if applicable):  - not currently testing, plan to start for next visit    DRUG INTERACTIONS  - No significant drug-drug interactions exist that require change in therapy  _______________________________________________________________________  PATIENT EDUCATION/GOALS    1. Discussed disease specific goals:   - Fasting B - 130 mg/dL  - Postprandial BG: less than 180 mg/dL  - A1c: less than 7%    2. Patient referred to pharmacy team for uncontrolled DM with most recent A1C 12.6%. Patient admits that this is due to poor diet and he has been reducing carb intake since PCP visit. He is amenable to aggressive DM therapy,l so we will make the following changes and follow up in 3 weeks.  - Continue Glimepiride 8 mg daily  - Continue Metformin 1000 mg BID  - Start Jardiance 25 mg daily  - Start Mounjaro 2.5 mg weekly    Mounjaro Education:     Counseled patient on Mounjaro MOA, expectations, side effects, duration of therapy, administration, and monitoring  parameters.  Provided detailed dosing and administration counseling to ensure proper technique.   Reviewed Mounjaro titration schedule, starting with 2.5 mg once weekly to a goal of 15 mg once weekly if tolerated  Counseled patient on the benefits of GLP-1ra glycemic control and weight loss  Reviewed storage requirements of Mounjaro when not in use, and when to administer the medication if a dose is missed.  Advised patient that they may experience improved satiety after meals and portion sizes of meals may be reduced as doses of Mounjaro increase.    _______________________________________________________________________    Assessment/Plan   Problem List Items Addressed This Visit       Type 2 diabetes mellitus    - Continue Glimepiride 8 mg daily  - Continue Metformin 1000 mg BID  - Start Jardiance 25 mg daily  - Start Mounjaro 2.5 mg weekly         Relevant Medications    empagliflozin (Jardiance) 25 mg tablet    tirzepatide (Mounjaro) 2.5 mg/0.5 mL pen injector    Other Relevant Orders    Referral to Clinical Pharmacy        Continue all meds under the continuation of care with the referring provider and clinical pharmacy team.    Clinical Pharmacist follow-up: 1 month    Larry Bentley, PharmD     Verbal consent to manage patient's drug therapy was obtained from [the patient and/or an individual authorized to act on behalf of a patient]. They were informed they may decline to participate or withdraw from participation in pharmacy services at any time.         [1]   Allergies  Allergen Reactions    Levofloxacin Itching

## 2025-07-15 NOTE — ASSESSMENT & PLAN NOTE
- Continue Glimepiride 8 mg daily  - Continue Metformin 1000 mg BID  - Start Jardiance 25 mg daily  - Start Mounjaro 2.5 mg weekly

## 2025-07-16 ENCOUNTER — PHARMACY VISIT (OUTPATIENT)
Dept: PHARMACY | Facility: CLINIC | Age: 55
End: 2025-07-16
Payer: COMMERCIAL

## 2025-07-23 ENCOUNTER — TELEPHONE (OUTPATIENT)
Dept: PHARMACY | Facility: HOSPITAL | Age: 55
End: 2025-07-23
Payer: COMMERCIAL

## 2025-07-23 DIAGNOSIS — E11.69 TYPE 2 DIABETES MELLITUS WITH OTHER SPECIFIED COMPLICATION, UNSPECIFIED WHETHER LONG TERM INSULIN USE (MULTI): Primary | ICD-10-CM

## 2025-07-23 RX ORDER — LANCETS 33 GAUGE
EACH MISCELLANEOUS
Qty: 100 EACH | Refills: 11 | Status: SHIPPED | OUTPATIENT
Start: 2025-07-23

## 2025-07-30 ENCOUNTER — TELEPHONE (OUTPATIENT)
Dept: PHARMACY | Facility: HOSPITAL | Age: 55
End: 2025-07-30
Payer: COMMERCIAL

## 2025-07-30 DIAGNOSIS — E11.69 TYPE 2 DIABETES MELLITUS WITH OTHER SPECIFIED COMPLICATION, UNSPECIFIED WHETHER LONG TERM INSULIN USE (MULTI): ICD-10-CM

## 2025-07-30 RX ORDER — LANCETS 33 GAUGE
EACH MISCELLANEOUS
Qty: 100 EACH | Refills: 11 | Status: SHIPPED | OUTPATIENT
Start: 2025-07-30

## 2025-08-06 ENCOUNTER — APPOINTMENT (OUTPATIENT)
Dept: PHARMACY | Facility: HOSPITAL | Age: 55
End: 2025-08-06
Payer: COMMERCIAL

## 2025-08-06 DIAGNOSIS — E11.69 TYPE 2 DIABETES MELLITUS WITH OTHER SPECIFIED COMPLICATION, UNSPECIFIED WHETHER LONG TERM INSULIN USE (MULTI): Primary | ICD-10-CM

## 2025-08-06 PROCEDURE — RXMED WILLOW AMBULATORY MEDICATION CHARGE

## 2025-08-06 RX ORDER — TIRZEPATIDE 5 MG/.5ML
5 INJECTION, SOLUTION SUBCUTANEOUS WEEKLY
Qty: 2 ML | Refills: 0 | Status: SHIPPED | OUTPATIENT
Start: 2025-08-06

## 2025-08-06 RX ORDER — DEXTROSE 4 G
TABLET,CHEWABLE ORAL
Qty: 1 EACH | Refills: 0 | Status: SHIPPED | OUTPATIENT
Start: 2025-08-06

## 2025-08-06 ASSESSMENT — ENCOUNTER SYMPTOMS: POLYPHAGIA: 1

## 2025-08-06 NOTE — ASSESSMENT & PLAN NOTE
- Continue Glimepiride 8 mg daily  - Continue Metformin 1000 mg BID  - Continue Jardiance 25 mg daily  - Increase to Mounjaro 5 mg weekly

## 2025-08-06 NOTE — PROGRESS NOTES
"Pharmacy Clinic Note    Juancarlos Valle is a 55 y.o. male was referred to Clinical Pharmacy Team for diabetes management.     Referring Provider: Eliseo Cleaning APRN-*    Subjective   Allergies[1]    [id number] - [place], [place] - [address]  [address] [facility] [address]  Phone: [phone] Fax: [phone]     [facility]  [address] [facility] [address]  Phone: [phone] Fax: [phone]      Diabetes  He presents for his follow-up diabetic visit. He has type 2 diabetes mellitus. His disease course has been worsening. There are no hypoglycemic associated symptoms. Associated symptoms include polyphagia. There are no hypoglycemic complications. Risk factors for coronary artery disease include diabetes mellitus, male sex and obesity. Current diabetic treatments: Glimepiride 4 mg two tabs daily; Metformin 1000 mg BID, Jardiance 25 mg daily, Mounjaro 2.5 mg weekly. He is compliant with treatment all of the time. He is following a generally unhealthy diet. (Does not currently test)       Objective     There were no vitals taken for this visit.     LAB  Lab Results   Component Value Date    BILITOT 1.6 (H) 06/04/2025    CALCIUM 9.2 06/04/2025    CO2 23 06/04/2025     06/04/2025    CREATININE 0.70 06/04/2025    GLUCOSE 288 (H) 06/04/2025    ALKPHOS 107 06/04/2025    K 4.2 06/04/2025    PROT 6.4 06/04/2025     06/04/2025    AST 18 06/04/2025    ALT 35 06/04/2025    BUN 12 06/04/2025    ANIONGAP 12 06/04/2025    ALBUMIN 4.2 06/04/2025    GFRMALE >90 01/23/2023     Lab Results   Component Value Date    TRIG 137 06/04/2025    CHOL 126 06/04/2025    LDLCALC 65 06/04/2025    HDL 39 (L) 06/04/2025     Lab Results   Component Value Date    HGBA1C 12.6 (H) 06/04/2025     Estimated body mass index is 45.86 kg/m² as calculated from the following:    Height as of 5/30/25: 1.651 m (5' 5\").    Weight as of 5/30/25: 125 kg (275 lb 9.6 oz).    Current " Outpatient Medications on File Prior to Visit   Medication Sig Dispense Refill    aspirin 81 mg EC tablet Take 1 tablet (81 mg) by mouth once daily.      atorvastatin (Lipitor) 80 mg tablet Take 1 tablet (80 mg) by mouth once daily. 90 tablet 3    ergocalciferol (Vitamin D-2) 1250 mcg (50,000 units) capsule Take 1 capsule (1.25 mg) by mouth 1 (one) time per week. 12 capsule 2    glimepiride (AmaryL) 4 mg tablet Take 1 tablet (4 mg) by mouth 2 times a day. 200 tablet 3    lisinopril 10 mg tablet Take 1 tablet (10 mg) by mouth once daily. 90 tablet 3    metFORMIN (Glucophage) 1,000 mg tablet Take 1 tablet (1,000 mg) by mouth 2 times daily (morning and late afternoon). 200 tablet 3    metoprolol succinate XL (Toprol-XL) 200 mg 24 hr tablet Take 1 tablet (200 mg) by mouth once daily. 90 tablet 3    nitroglycerin (Nitrostat) 0.4 mg SL tablet Place 1 tablet (0.4 mg) under the tongue every 5 minutes if needed for chest pain. 45 tablet 3     No current facility-administered medications on file prior to visit.        HISTORICAL PHARMACOTHERAPY  - Ozempic: cost    SMBG (if applicable):  - not currently testing, plan to start for next visit    DRUG INTERACTIONS  - No significant drug-drug interactions exist that require change in therapy  _______________________________________________________________________  PATIENT EDUCATION/GOALS    1. Discussed disease specific goals:   - Fasting B - 130 mg/dL  - Postprandial BG: less than 180 mg/dL  - A1c: less than 7%    2. Since last visit, patient has started Jardiance 25 mg and Mounjaro 2.5 mg. Outside of minor constipation, he is tolerating the medication very well. He has been unable to test BG values d/t insurance issues, but we are working to find preferred testing supplies. Given greatly elevated A1C, we will proceed with increase to Mounjaro 5 mg weekly.  - Continue Glimepiride 8 mg daily  - Continue Metformin 1000 mg BID  - Continue Jardiance 25 mg daily  - Increase to  Mounjaro 5 mg weekly  _______________________________________________________________________    Assessment/Plan   Problem List Items Addressed This Visit       Type 2 diabetes mellitus - Primary    - Continue Glimepiride 8 mg daily  - Continue Metformin 1000 mg BID  - Continue Jardiance 25 mg daily  - Increase to Mounjaro 5 mg weekly               Continue all meds under the continuation of care with the referring provider and clinical pharmacy team.    Clinical Pharmacist follow-up: 1 month    Larry Bentley, PharmD     Verbal consent to manage patient's drug therapy was obtained from [the patient and/or an individual authorized to act on behalf of a patient]. They were informed they may decline to participate or withdraw from participation in pharmacy services at any time.           [1]   Allergies  Allergen Reactions    Levofloxacin Itching

## 2025-08-11 ENCOUNTER — APPOINTMENT (OUTPATIENT)
Dept: PRIMARY CARE | Facility: CLINIC | Age: 55
End: 2025-08-11
Payer: COMMERCIAL

## 2025-08-11 DIAGNOSIS — E11.69 TYPE 2 DIABETES MELLITUS WITH OTHER SPECIFIED COMPLICATION, UNSPECIFIED WHETHER LONG TERM INSULIN USE (MULTI): Primary | ICD-10-CM

## 2025-08-13 ENCOUNTER — PHARMACY VISIT (OUTPATIENT)
Dept: PHARMACY | Facility: CLINIC | Age: 55
End: 2025-08-13
Payer: COMMERCIAL

## 2025-08-13 PROCEDURE — RXMED WILLOW AMBULATORY MEDICATION CHARGE

## 2025-08-20 ENCOUNTER — PHARMACY VISIT (OUTPATIENT)
Dept: PHARMACY | Facility: CLINIC | Age: 55
End: 2025-08-20
Payer: COMMERCIAL

## 2025-08-28 ENCOUNTER — TELEPHONE (OUTPATIENT)
Dept: PRIMARY CARE | Facility: CLINIC | Age: 55
End: 2025-08-28
Payer: COMMERCIAL

## 2025-08-28 ENCOUNTER — TELEPHONE (OUTPATIENT)
Dept: CARDIOLOGY | Facility: HOSPITAL | Age: 55
End: 2025-08-28
Payer: COMMERCIAL

## 2025-08-28 DIAGNOSIS — E55.9 VITAMIN D DEFICIENCY: ICD-10-CM

## 2025-08-28 DIAGNOSIS — E11.69 TYPE 2 DIABETES MELLITUS WITH OTHER SPECIFIED COMPLICATION, UNSPECIFIED WHETHER LONG TERM INSULIN USE (MULTI): ICD-10-CM

## 2025-08-28 PROCEDURE — RXMED WILLOW AMBULATORY MEDICATION CHARGE

## 2025-08-29 ENCOUNTER — PHARMACY VISIT (OUTPATIENT)
Dept: PHARMACY | Facility: CLINIC | Age: 55
End: 2025-08-29
Payer: COMMERCIAL

## 2025-09-01 PROCEDURE — RXMED WILLOW AMBULATORY MEDICATION CHARGE

## 2025-09-01 RX ORDER — GLIMEPIRIDE 4 MG/1
4 TABLET ORAL 2 TIMES DAILY
Qty: 200 TABLET | Refills: 0 | Status: SHIPPED | OUTPATIENT
Start: 2025-09-01 | End: 2026-02-28

## 2025-09-01 RX ORDER — ERGOCALCIFEROL 1.25 MG/1
1.25 CAPSULE ORAL WEEKLY
Qty: 12 CAPSULE | Refills: 0 | Status: SHIPPED | OUTPATIENT
Start: 2025-09-01 | End: 2026-05-29

## 2025-09-01 RX ORDER — METFORMIN HYDROCHLORIDE 1000 MG/1
1000 TABLET ORAL
Qty: 200 TABLET | Refills: 0 | Status: SHIPPED | OUTPATIENT
Start: 2025-09-01 | End: 2026-10-06

## 2025-09-03 ENCOUNTER — APPOINTMENT (OUTPATIENT)
Dept: CARDIOLOGY | Facility: CLINIC | Age: 55
End: 2025-09-03
Payer: COMMERCIAL

## 2025-09-03 ENCOUNTER — APPOINTMENT (OUTPATIENT)
Dept: PHARMACY | Facility: HOSPITAL | Age: 55
End: 2025-09-03
Payer: COMMERCIAL

## 2025-09-03 PROCEDURE — RXMED WILLOW AMBULATORY MEDICATION CHARGE

## 2025-09-03 ASSESSMENT — ENCOUNTER SYMPTOMS: POLYPHAGIA: 1

## 2025-09-05 ENCOUNTER — PHARMACY VISIT (OUTPATIENT)
Dept: PHARMACY | Facility: CLINIC | Age: 55
End: 2025-09-05
Payer: COMMERCIAL

## 2025-09-23 ENCOUNTER — APPOINTMENT (OUTPATIENT)
Dept: PRIMARY CARE | Facility: CLINIC | Age: 55
End: 2025-09-23
Payer: COMMERCIAL

## 2025-09-24 ENCOUNTER — APPOINTMENT (OUTPATIENT)
Dept: PHARMACY | Facility: HOSPITAL | Age: 55
End: 2025-09-24
Payer: COMMERCIAL